# Patient Record
Sex: FEMALE | Race: WHITE | NOT HISPANIC OR LATINO | Employment: OTHER | URBAN - METROPOLITAN AREA
[De-identification: names, ages, dates, MRNs, and addresses within clinical notes are randomized per-mention and may not be internally consistent; named-entity substitution may affect disease eponyms.]

---

## 2017-01-26 DIAGNOSIS — M25.562 LEFT KNEE PAIN: Primary | ICD-10-CM

## 2017-01-31 ENCOUNTER — CLINICAL SUPPORT (OUTPATIENT)
Dept: REHABILITATION | Facility: HOSPITAL | Age: 77
End: 2017-01-31
Attending: PSYCHIATRY & NEUROLOGY
Payer: COMMERCIAL

## 2017-01-31 DIAGNOSIS — G89.29 CHRONIC PAIN OF LEFT KNEE: Primary | ICD-10-CM

## 2017-01-31 DIAGNOSIS — M25.562 CHRONIC PAIN OF LEFT KNEE: Primary | ICD-10-CM

## 2017-01-31 PROCEDURE — 97110 THERAPEUTIC EXERCISES: CPT | Performed by: PHYSICAL THERAPIST

## 2017-01-31 PROCEDURE — G8978 MOBILITY CURRENT STATUS: HCPCS | Mod: CL | Performed by: PHYSICAL THERAPIST

## 2017-01-31 PROCEDURE — G8979 MOBILITY GOAL STATUS: HCPCS | Mod: CJ | Performed by: PHYSICAL THERAPIST

## 2017-01-31 PROCEDURE — 97162 PT EVAL MOD COMPLEX 30 MIN: CPT | Performed by: PHYSICAL THERAPIST

## 2017-01-31 PROCEDURE — 97140 MANUAL THERAPY 1/> REGIONS: CPT | Performed by: PHYSICAL THERAPIST
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
## 2017-02-03 ENCOUNTER — CLINICAL SUPPORT (OUTPATIENT)
Dept: REHABILITATION | Facility: HOSPITAL | Age: 77
End: 2017-02-03
Attending: PSYCHIATRY & NEUROLOGY
Payer: COMMERCIAL

## 2017-02-03 DIAGNOSIS — G89.29 CHRONIC PAIN OF LEFT KNEE: Primary | ICD-10-CM

## 2017-02-03 DIAGNOSIS — M25.562 CHRONIC PAIN OF LEFT KNEE: Primary | ICD-10-CM

## 2017-02-03 PROCEDURE — 97014 ELECTRIC STIMULATION THERAPY: CPT | Performed by: PHYSICAL THERAPIST

## 2017-02-03 PROCEDURE — 97110 THERAPEUTIC EXERCISES: CPT | Performed by: PHYSICAL THERAPIST

## 2017-02-03 PROCEDURE — 97140 MANUAL THERAPY 1/> REGIONS: CPT | Performed by: PHYSICAL THERAPIST
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
## 2017-02-08 ENCOUNTER — CLINICAL SUPPORT (OUTPATIENT)
Dept: REHABILITATION | Facility: HOSPITAL | Age: 77
End: 2017-02-08
Attending: PSYCHIATRY & NEUROLOGY
Payer: COMMERCIAL

## 2017-02-08 DIAGNOSIS — M25.562 CHRONIC PAIN OF LEFT KNEE: Primary | ICD-10-CM

## 2017-02-08 DIAGNOSIS — G89.29 CHRONIC PAIN OF LEFT KNEE: Primary | ICD-10-CM

## 2017-02-08 PROCEDURE — 97140 MANUAL THERAPY 1/> REGIONS: CPT | Performed by: PHYSICAL THERAPIST

## 2017-02-08 PROCEDURE — 97014 ELECTRIC STIMULATION THERAPY: CPT | Performed by: PHYSICAL THERAPIST

## 2017-02-08 PROCEDURE — 97110 THERAPEUTIC EXERCISES: CPT | Performed by: PHYSICAL THERAPIST
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
## 2017-02-10 ENCOUNTER — CLINICAL SUPPORT (OUTPATIENT)
Dept: REHABILITATION | Facility: HOSPITAL | Age: 77
End: 2017-02-10
Attending: PSYCHIATRY & NEUROLOGY
Payer: COMMERCIAL

## 2017-02-10 DIAGNOSIS — M25.562 CHRONIC PAIN OF LEFT KNEE: Primary | ICD-10-CM

## 2017-02-10 DIAGNOSIS — G89.29 CHRONIC PAIN OF LEFT KNEE: Primary | ICD-10-CM

## 2017-02-10 PROCEDURE — 97014 ELECTRIC STIMULATION THERAPY: CPT | Performed by: PHYSICAL THERAPIST

## 2017-02-10 PROCEDURE — 97140 MANUAL THERAPY 1/> REGIONS: CPT | Performed by: PHYSICAL THERAPIST

## 2017-02-10 PROCEDURE — 97110 THERAPEUTIC EXERCISES: CPT | Performed by: PHYSICAL THERAPIST
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
## 2017-02-14 ENCOUNTER — CLINICAL SUPPORT (OUTPATIENT)
Dept: REHABILITATION | Facility: HOSPITAL | Age: 77
End: 2017-02-14
Attending: PSYCHIATRY & NEUROLOGY
Payer: COMMERCIAL

## 2017-02-14 DIAGNOSIS — M25.562 CHRONIC PAIN OF LEFT KNEE: Primary | ICD-10-CM

## 2017-02-14 DIAGNOSIS — G89.29 CHRONIC PAIN OF LEFT KNEE: Primary | ICD-10-CM

## 2017-02-14 PROCEDURE — 97014 ELECTRIC STIMULATION THERAPY: CPT | Performed by: PHYSICAL THERAPIST

## 2017-02-14 PROCEDURE — 97140 MANUAL THERAPY 1/> REGIONS: CPT | Performed by: PHYSICAL THERAPIST

## 2017-02-14 PROCEDURE — 97110 THERAPEUTIC EXERCISES: CPT | Performed by: PHYSICAL THERAPIST
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
## 2017-02-17 ENCOUNTER — CLINICAL SUPPORT (OUTPATIENT)
Dept: REHABILITATION | Facility: HOSPITAL | Age: 77
End: 2017-02-17
Attending: PSYCHIATRY & NEUROLOGY
Payer: COMMERCIAL

## 2017-02-17 DIAGNOSIS — M25.562 CHRONIC PAIN OF LEFT KNEE: Primary | ICD-10-CM

## 2017-02-17 DIAGNOSIS — G89.29 CHRONIC PAIN OF LEFT KNEE: Primary | ICD-10-CM

## 2017-02-17 PROCEDURE — 97140 MANUAL THERAPY 1/> REGIONS: CPT | Performed by: PHYSICAL THERAPIST

## 2017-02-17 PROCEDURE — 97014 ELECTRIC STIMULATION THERAPY: CPT | Performed by: PHYSICAL THERAPIST

## 2017-02-17 PROCEDURE — 97110 THERAPEUTIC EXERCISES: CPT | Performed by: PHYSICAL THERAPIST
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
## 2017-02-22 ENCOUNTER — CLINICAL SUPPORT (OUTPATIENT)
Dept: REHABILITATION | Facility: HOSPITAL | Age: 77
End: 2017-02-22
Attending: PSYCHIATRY & NEUROLOGY
Payer: MEDICARE

## 2017-02-22 DIAGNOSIS — G89.29 CHRONIC PAIN OF LEFT KNEE: Primary | ICD-10-CM

## 2017-02-22 DIAGNOSIS — M25.562 CHRONIC PAIN OF LEFT KNEE: Primary | ICD-10-CM

## 2017-02-22 PROCEDURE — 97140 MANUAL THERAPY 1/> REGIONS: CPT | Performed by: PHYSICAL THERAPIST

## 2017-02-22 PROCEDURE — 97110 THERAPEUTIC EXERCISES: CPT | Performed by: PHYSICAL THERAPIST
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
## 2017-02-24 ENCOUNTER — CLINICAL SUPPORT (OUTPATIENT)
Dept: REHABILITATION | Facility: HOSPITAL | Age: 77
End: 2017-02-24
Attending: PSYCHIATRY & NEUROLOGY
Payer: COMMERCIAL

## 2017-02-24 DIAGNOSIS — M25.562 CHRONIC PAIN OF LEFT KNEE: Primary | ICD-10-CM

## 2017-02-24 DIAGNOSIS — G89.29 CHRONIC PAIN OF LEFT KNEE: Primary | ICD-10-CM

## 2017-02-24 PROCEDURE — 97140 MANUAL THERAPY 1/> REGIONS: CPT | Performed by: PHYSICAL THERAPIST

## 2017-02-24 PROCEDURE — 97110 THERAPEUTIC EXERCISES: CPT | Performed by: PHYSICAL THERAPIST
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
## 2017-02-27 ENCOUNTER — CLINICAL SUPPORT (OUTPATIENT)
Dept: REHABILITATION | Facility: HOSPITAL | Age: 77
End: 2017-02-27
Attending: PSYCHIATRY & NEUROLOGY
Payer: COMMERCIAL

## 2017-02-27 DIAGNOSIS — G89.29 CHRONIC PAIN OF LEFT KNEE: Primary | ICD-10-CM

## 2017-02-27 DIAGNOSIS — M25.562 CHRONIC PAIN OF LEFT KNEE: Primary | ICD-10-CM

## 2017-02-27 DIAGNOSIS — M25.562 ACUTE PAIN OF LEFT KNEE: ICD-10-CM

## 2017-02-27 PROCEDURE — 97140 MANUAL THERAPY 1/> REGIONS: CPT | Performed by: PHYSICAL THERAPIST

## 2017-02-27 PROCEDURE — 97110 THERAPEUTIC EXERCISES: CPT | Performed by: PHYSICAL THERAPIST
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
## 2017-03-01 ENCOUNTER — CLINICAL SUPPORT (OUTPATIENT)
Dept: REHABILITATION | Facility: HOSPITAL | Age: 77
End: 2017-03-01
Attending: PSYCHIATRY & NEUROLOGY
Payer: MEDICARE

## 2017-03-01 DIAGNOSIS — G89.29 CHRONIC PAIN OF LEFT KNEE: Primary | ICD-10-CM

## 2017-03-01 DIAGNOSIS — M25.562 CHRONIC PAIN OF LEFT KNEE: Primary | ICD-10-CM

## 2017-03-01 PROCEDURE — G8978 MOBILITY CURRENT STATUS: HCPCS | Mod: CK | Performed by: PHYSICAL THERAPIST

## 2017-03-01 PROCEDURE — 97140 MANUAL THERAPY 1/> REGIONS: CPT | Performed by: PHYSICAL THERAPIST

## 2017-03-01 PROCEDURE — G8979 MOBILITY GOAL STATUS: HCPCS | Mod: CJ | Performed by: PHYSICAL THERAPIST

## 2017-03-01 PROCEDURE — 97110 THERAPEUTIC EXERCISES: CPT | Performed by: PHYSICAL THERAPIST
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
## 2017-03-06 ENCOUNTER — CLINICAL SUPPORT (OUTPATIENT)
Dept: REHABILITATION | Facility: HOSPITAL | Age: 77
End: 2017-03-06
Attending: PSYCHIATRY & NEUROLOGY
Payer: MEDICARE

## 2017-03-06 DIAGNOSIS — M25.562 CHRONIC PAIN OF LEFT KNEE: Primary | ICD-10-CM

## 2017-03-06 DIAGNOSIS — G89.29 CHRONIC PAIN OF LEFT KNEE: Primary | ICD-10-CM

## 2017-03-06 PROCEDURE — 97110 THERAPEUTIC EXERCISES: CPT | Performed by: PHYSICAL THERAPIST
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
## 2017-03-14 ENCOUNTER — CLINICAL SUPPORT (OUTPATIENT)
Dept: REHABILITATION | Facility: HOSPITAL | Age: 77
End: 2017-03-14
Attending: PSYCHIATRY & NEUROLOGY
Payer: MEDICARE

## 2017-03-14 DIAGNOSIS — G89.29 CHRONIC PAIN OF LEFT KNEE: Primary | ICD-10-CM

## 2017-03-14 DIAGNOSIS — M25.562 CHRONIC PAIN OF LEFT KNEE: Primary | ICD-10-CM

## 2017-03-14 PROCEDURE — 97110 THERAPEUTIC EXERCISES: CPT | Performed by: PHYSICAL THERAPIST

## 2017-03-14 PROCEDURE — 97140 MANUAL THERAPY 1/> REGIONS: CPT | Performed by: PHYSICAL THERAPIST
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
## 2017-03-17 ENCOUNTER — CLINICAL SUPPORT (OUTPATIENT)
Dept: REHABILITATION | Facility: HOSPITAL | Age: 77
End: 2017-03-17
Attending: PSYCHIATRY & NEUROLOGY
Payer: MEDICARE

## 2017-03-17 DIAGNOSIS — G89.29 CHRONIC PAIN OF LEFT KNEE: Primary | ICD-10-CM

## 2017-03-17 DIAGNOSIS — M25.562 CHRONIC PAIN OF LEFT KNEE: Primary | ICD-10-CM

## 2017-03-17 PROCEDURE — 97110 THERAPEUTIC EXERCISES: CPT | Performed by: PHYSICAL THERAPIST
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
## 2017-03-20 ENCOUNTER — CLINICAL SUPPORT (OUTPATIENT)
Dept: REHABILITATION | Facility: HOSPITAL | Age: 77
End: 2017-03-20
Attending: PSYCHIATRY & NEUROLOGY
Payer: MEDICARE

## 2017-03-20 DIAGNOSIS — G89.29 CHRONIC PAIN OF LEFT KNEE: Primary | ICD-10-CM

## 2017-03-20 DIAGNOSIS — M25.562 CHRONIC PAIN OF LEFT KNEE: Primary | ICD-10-CM

## 2017-03-20 PROCEDURE — G8979 MOBILITY GOAL STATUS: HCPCS | Mod: CJ | Performed by: PHYSICAL THERAPIST

## 2017-03-20 PROCEDURE — 97110 THERAPEUTIC EXERCISES: CPT | Performed by: PHYSICAL THERAPIST

## 2017-03-20 PROCEDURE — 97140 MANUAL THERAPY 1/> REGIONS: CPT | Performed by: PHYSICAL THERAPIST

## 2017-03-20 PROCEDURE — G8978 MOBILITY CURRENT STATUS: HCPCS | Mod: CK | Performed by: PHYSICAL THERAPIST

## 2017-03-20 PROCEDURE — G8980 MOBILITY D/C STATUS: HCPCS | Mod: CK | Performed by: PHYSICAL THERAPIST
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
## 2017-03-23 ENCOUNTER — CLINICAL SUPPORT (OUTPATIENT)
Dept: REHABILITATION | Facility: HOSPITAL | Age: 77
End: 2017-03-23
Attending: PSYCHIATRY & NEUROLOGY
Payer: MEDICARE

## 2017-03-23 DIAGNOSIS — M25.562 CHRONIC PAIN OF LEFT KNEE: Primary | ICD-10-CM

## 2017-03-23 DIAGNOSIS — G89.29 CHRONIC PAIN OF LEFT KNEE: Primary | ICD-10-CM

## 2017-03-23 PROCEDURE — 97110 THERAPEUTIC EXERCISES: CPT | Performed by: PHYSICAL THERAPIST

## 2017-03-23 PROCEDURE — 97014 ELECTRIC STIMULATION THERAPY: CPT | Performed by: PHYSICAL THERAPIST

## 2017-03-23 PROCEDURE — 97140 MANUAL THERAPY 1/> REGIONS: CPT | Performed by: PHYSICAL THERAPIST
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
## 2017-03-29 ENCOUNTER — CLINICAL SUPPORT (OUTPATIENT)
Dept: REHABILITATION | Facility: HOSPITAL | Age: 77
End: 2017-03-29
Attending: PSYCHIATRY & NEUROLOGY
Payer: MEDICARE

## 2017-03-29 DIAGNOSIS — M25.562 CHRONIC PAIN OF LEFT KNEE: Primary | ICD-10-CM

## 2017-03-29 DIAGNOSIS — M25.562 ACUTE PAIN OF LEFT KNEE: ICD-10-CM

## 2017-03-29 DIAGNOSIS — G89.29 CHRONIC PAIN OF LEFT KNEE: Primary | ICD-10-CM

## 2017-03-29 PROCEDURE — G8980 MOBILITY D/C STATUS: HCPCS | Mod: CJ | Performed by: PHYSICAL THERAPIST

## 2017-03-29 PROCEDURE — 97110 THERAPEUTIC EXERCISES: CPT | Performed by: PHYSICAL THERAPIST

## 2017-03-29 PROCEDURE — 97014 ELECTRIC STIMULATION THERAPY: CPT | Performed by: PHYSICAL THERAPIST

## 2017-03-29 PROCEDURE — G8978 MOBILITY CURRENT STATUS: HCPCS | Mod: CJ | Performed by: PHYSICAL THERAPIST

## 2017-03-29 PROCEDURE — G8979 MOBILITY GOAL STATUS: HCPCS | Mod: CJ | Performed by: PHYSICAL THERAPIST
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
## 2019-01-07 ENCOUNTER — OFFICE VISIT (OUTPATIENT)
Dept: URGENT CARE | Facility: CLINIC | Age: 79
End: 2019-01-07
Payer: MEDICARE

## 2019-01-07 VITALS
HEART RATE: 77 BPM | WEIGHT: 182.75 LBS | DIASTOLIC BLOOD PRESSURE: 60 MMHG | BODY MASS INDEX: 30.41 KG/M2 | OXYGEN SATURATION: 99 % | SYSTOLIC BLOOD PRESSURE: 104 MMHG | TEMPERATURE: 98 F

## 2019-01-07 DIAGNOSIS — R30.0 DYSURIA: Primary | ICD-10-CM

## 2019-01-07 DIAGNOSIS — N30.00 ACUTE CYSTITIS WITHOUT HEMATURIA: ICD-10-CM

## 2019-01-07 LAB
BILIRUB SERPL-MCNC: NEGATIVE MG/DL
BLOOD URINE, POC: 250
COLOR, POC UA: ABNORMAL
GLUCOSE UR QL STRIP: ABNORMAL
KETONES UR QL STRIP: NEGATIVE
LEUKOCYTE ESTERASE URINE, POC: ABNORMAL
NITRITE, POC UA: NEGATIVE
PH, POC UA: 5
PROTEIN, POC: ABNORMAL
SPECIFIC GRAVITY, POC UA: 1
UROBILINOGEN, POC UA: NORMAL

## 2019-01-07 PROCEDURE — 87086 URINE CULTURE/COLONY COUNT: CPT

## 2019-01-07 PROCEDURE — 99214 OFFICE O/P EST MOD 30 MIN: CPT | Mod: S$PBB,,, | Performed by: PHYSICIAN ASSISTANT

## 2019-01-07 PROCEDURE — 99999 PR PBB SHADOW E&M-EST. PATIENT-LVL IV: CPT | Mod: PBBFAC,,, | Performed by: PHYSICIAN ASSISTANT

## 2019-01-07 PROCEDURE — 99214 PR OFFICE/OUTPT VISIT, EST, LEVL IV, 30-39 MIN: ICD-10-PCS | Mod: S$PBB,,, | Performed by: PHYSICIAN ASSISTANT

## 2019-01-07 PROCEDURE — 87077 CULTURE AEROBIC IDENTIFY: CPT | Mod: 59

## 2019-01-07 PROCEDURE — 87088 URINE BACTERIA CULTURE: CPT

## 2019-01-07 PROCEDURE — 81002 URINALYSIS NONAUTO W/O SCOPE: CPT | Mod: PBBFAC,PO | Performed by: PHYSICIAN ASSISTANT

## 2019-01-07 PROCEDURE — 87186 SC STD MICRODIL/AGAR DIL: CPT

## 2019-01-07 PROCEDURE — 99999 PR PBB SHADOW E&M-EST. PATIENT-LVL IV: ICD-10-PCS | Mod: PBBFAC,,, | Performed by: PHYSICIAN ASSISTANT

## 2019-01-07 PROCEDURE — 99214 OFFICE O/P EST MOD 30 MIN: CPT | Mod: PBBFAC,PO | Performed by: PHYSICIAN ASSISTANT

## 2019-01-07 RX ORDER — MINERAL OIL
ENEMA (ML) RECTAL
COMMUNITY

## 2019-01-07 RX ORDER — SULFAMETHOXAZOLE AND TRIMETHOPRIM 800; 160 MG/1; MG/1
1 TABLET ORAL 2 TIMES DAILY
Qty: 14 TABLET | Refills: 0 | Status: SHIPPED | OUTPATIENT
Start: 2019-01-07 | End: 2019-01-14

## 2019-01-07 RX ORDER — POTASSIUM CHLORIDE 750 MG/1
10 TABLET, EXTENDED RELEASE ORAL ONCE
COMMUNITY

## 2019-01-07 RX ORDER — LOSARTAN POTASSIUM 50 MG/1
TABLET ORAL
COMMUNITY
Start: 2018-04-20 | End: 2021-11-11

## 2019-01-07 NOTE — PATIENT INSTRUCTIONS

## 2019-01-07 NOTE — PROGRESS NOTES
"Subjective:       Patient ID: Dawn Gamboa is a 78 y.o. female.    Chief Complaint: Urinary Frequency    Urinary Tract Infection    This is a new problem. The current episode started in the past 7 days (for the last several days noticing odor and cloudiness to urine). The problem occurs every urination. The problem has been unchanged. Quality: denies burning although a few days ago she noticed some "irritation" she thought from a cream she used. The patient is experiencing no pain. There has been no fever. Pertinent negatives include no chills, discharge, flank pain, frequency, hematuria, nausea, urgency, vomiting or rash. She has tried sitz baths for the symptoms. The treatment provided mild relief. has been several years since last UTI but it was a severe one with very resistant bacteria requiring home PICC line and IV abx     Review of Systems   Constitutional: Negative for chills, fatigue and fever.   HENT: Negative for congestion, rhinorrhea, sneezing and sore throat.    Respiratory: Negative for cough and shortness of breath.    Gastrointestinal: Negative for abdominal pain, nausea and vomiting.   Genitourinary: Negative for difficulty urinating, dysuria, flank pain, frequency, hematuria and urgency.   Musculoskeletal: Negative for myalgias.        Of note patient mentions having a fall several weeks ago, mechanical tripped while taking out garbage can. Refused to be medically evaluated but dtg is a RN and has been watching her, she's been ambulating with her walker but initially very limited movement, has been staying home. Reported bruising over right thigh. Patient refuses eval and does not wish to have x rays done   Skin: Negative for rash.   Neurological: Negative for headaches.       Objective:      /60 (BP Location: Right arm, Patient Position: Sitting, BP Method: Large (Automatic))   Pulse 77   Temp 97.8 °F (36.6 °C) (Oral)   Wt 82.9 kg (182 lb 12.2 oz)   SpO2 99%   BMI 30.41 kg/m² "   Physical Exam   Constitutional: She is oriented to person, place, and time. She appears well-developed and well-nourished. No distress.   HENT:   Head: Normocephalic.   Right Ear: External ear normal.   Left Ear: External ear normal.   Nose: Nose normal.   Eyes: Conjunctivae and EOM are normal. Right eye exhibits no discharge. Left eye exhibits no discharge.   Neck: Normal range of motion. Neck supple.   Musculoskeletal:        Neurological: She is alert and oriented to person, place, and time. She has normal reflexes. She displays normal reflexes. Coordination normal.   Skin: Skin is warm and dry. She is not diaphoretic.   Vitals reviewed.      Assessment:       1. Dysuria    2. Acute cystitis without hematuria        Plan:       Dysuria  -     POCT urine dipstick without microscope    Acute cystitis without hematuria  -     sulfamethoxazole-trimethoprim 800-160mg (BACTRIM DS) 800-160 mg Tab; Take 1 tablet by mouth 2 (two) times daily. for 7 days  Dispense: 14 tablet; Refill: 0  -     Urine culture    +UA. Start bactrim due to allergies and age, follow up urine culture.  Discussed f/u with PCP regarding fall as she expressed interest in Physical Therapy which would likely be a good idea. She declines all offers for evaluation of her right hip and pelvis today, states she's doing fine.    Heather Trant PA-C [facility]

## 2019-01-10 LAB — BACTERIA UR CULT: NORMAL

## 2019-03-22 ENCOUNTER — OFFICE VISIT (OUTPATIENT)
Dept: URGENT CARE | Facility: CLINIC | Age: 79
End: 2019-03-22
Payer: MEDICARE

## 2019-03-22 VITALS
BODY MASS INDEX: 31.66 KG/M2 | TEMPERATURE: 99 F | SYSTOLIC BLOOD PRESSURE: 130 MMHG | DIASTOLIC BLOOD PRESSURE: 80 MMHG | WEIGHT: 190.25 LBS | HEART RATE: 102 BPM | OXYGEN SATURATION: 99 %

## 2019-03-22 DIAGNOSIS — N30.90 CYSTITIS: Primary | ICD-10-CM

## 2019-03-22 LAB
BILIRUB SERPL-MCNC: NEGATIVE MG/DL
BLOOD URINE, POC: ABNORMAL
COLOR, POC UA: YELLOW
GLUCOSE UR QL STRIP: NORMAL
KETONES UR QL STRIP: NEGATIVE
LEUKOCYTE ESTERASE URINE, POC: 2
NITRITE, POC UA: NEGATIVE
PH, POC UA: 7
PROTEIN, POC: NEGATIVE
SPECIFIC GRAVITY, POC UA: 1
UROBILINOGEN, POC UA: NORMAL

## 2019-03-22 PROCEDURE — 99214 PR OFFICE/OUTPT VISIT, EST, LEVL IV, 30-39 MIN: ICD-10-PCS | Mod: S$PBB,,, | Performed by: FAMILY MEDICINE

## 2019-03-22 PROCEDURE — 99213 OFFICE O/P EST LOW 20 MIN: CPT | Mod: PBBFAC,PO | Performed by: FAMILY MEDICINE

## 2019-03-22 PROCEDURE — 87086 URINE CULTURE/COLONY COUNT: CPT

## 2019-03-22 PROCEDURE — 87088 URINE BACTERIA CULTURE: CPT

## 2019-03-22 PROCEDURE — 87186 SC STD MICRODIL/AGAR DIL: CPT

## 2019-03-22 PROCEDURE — 87077 CULTURE AEROBIC IDENTIFY: CPT

## 2019-03-22 PROCEDURE — 81002 URINALYSIS NONAUTO W/O SCOPE: CPT | Mod: PBBFAC,PO | Performed by: FAMILY MEDICINE

## 2019-03-22 PROCEDURE — 99214 OFFICE O/P EST MOD 30 MIN: CPT | Mod: S$PBB,,, | Performed by: FAMILY MEDICINE

## 2019-03-22 PROCEDURE — 99999 PR PBB SHADOW E&M-EST. PATIENT-LVL III: ICD-10-PCS | Mod: PBBFAC,,, | Performed by: FAMILY MEDICINE

## 2019-03-22 PROCEDURE — 99999 PR PBB SHADOW E&M-EST. PATIENT-LVL III: CPT | Mod: PBBFAC,,, | Performed by: FAMILY MEDICINE

## 2019-03-22 RX ORDER — SULFAMETHOXAZOLE AND TRIMETHOPRIM 800; 160 MG/1; MG/1
1 TABLET ORAL 2 TIMES DAILY
Qty: 6 TABLET | Refills: 0 | Status: SHIPPED | OUTPATIENT
Start: 2019-03-22 | End: 2019-03-26 | Stop reason: SDUPTHER

## 2019-03-22 NOTE — PROGRESS NOTES
Subjective:       Patient ID: Dawn Gamboa is a 79 y.o. female.    Chief Complaint: Urinary Frequency    Urinary Tract Infection    This is a new problem. The current episode started yesterday. The problem occurs every urination. The problem has been gradually worsening. The quality of the pain is described as burning. The pain is moderate. There has been no fever. There is a history of pyelonephritis. Associated symptoms include hematuria. Pertinent negatives include no discharge, flank pain, frequency, vomiting or bubble bath use. She has tried nothing for the symptoms. The treatment provided no relief.     Review of Systems   Respiratory: Negative for shortness of breath.    Gastrointestinal: Negative for vomiting.   Genitourinary: Positive for hematuria. Negative for flank pain and frequency.       Objective:      Physical Exam   Constitutional: She appears well-developed and well-nourished. No distress.   HENT:   Head: Normocephalic and atraumatic.   Pulmonary/Chest: Effort normal and breath sounds normal. No respiratory distress. She has no wheezes.   Abdominal: Soft. She exhibits no distension. There is no tenderness. There is no guarding.   Musculoskeletal:   No CVA tenderness   Skin: Skin is warm and dry. No rash noted. She is not diaphoretic. No erythema.   Nursing note and vitals reviewed.      Assessment:       1. Cystitis        Plan:     Problem List Items Addressed This Visit        Renal/    Cystitis - Primary    Current Assessment & Plan     Push fluids.  Use cranberry juice.         Relevant Medications    sulfamethoxazole-trimethoprim 800-160mg (BACTRIM DS) 800-160 mg Tab    Other Relevant Orders    POCT urine dipstick without microscope    Urine culture

## 2019-03-25 LAB — BACTERIA UR CULT: NORMAL

## 2019-03-25 NOTE — PROGRESS NOTES
Please call pt with abnormal results. Pts urine has finally come back definitive for Klebsiella, which is resistant to Bactrim.  Will change to Rocephin given her PCN allergy. Where would would she like to have this injection?  [facility]?  Let me know

## 2019-03-25 NOTE — PROGRESS NOTES
Please call pt with abnormal results. Pt does not need appt at this time, unless they have questions or wish to further discuss.  Pt has gram neg whitney in her urine, likely E. Coli, will await C&S, cont abx.

## 2019-03-26 RX ORDER — NITROFURANTOIN 25; 75 MG/1; MG/1
100 CAPSULE ORAL 2 TIMES DAILY
Qty: 14 CAPSULE | Refills: 0 | Status: SHIPPED | OUTPATIENT
Start: 2019-03-26 | End: 2019-04-02

## 2019-04-29 ENCOUNTER — OFFICE VISIT (OUTPATIENT)
Dept: URGENT CARE | Facility: CLINIC | Age: 79
End: 2019-04-29
Payer: MEDICARE

## 2019-04-29 VITALS
HEIGHT: 64 IN | TEMPERATURE: 99 F | BODY MASS INDEX: 32.26 KG/M2 | WEIGHT: 188.94 LBS | OXYGEN SATURATION: 97 % | DIASTOLIC BLOOD PRESSURE: 62 MMHG | RESPIRATION RATE: 14 BRPM | HEART RATE: 83 BPM | SYSTOLIC BLOOD PRESSURE: 138 MMHG

## 2019-04-29 DIAGNOSIS — R39.9 UTI SYMPTOMS: Primary | ICD-10-CM

## 2019-04-29 DIAGNOSIS — R30.0 DYSURIA: ICD-10-CM

## 2019-04-29 LAB
BILIRUB SERPL-MCNC: NEGATIVE MG/DL
BLOOD URINE, POC: ABNORMAL
COLOR, POC UA: ABNORMAL
GLUCOSE UR QL STRIP: NORMAL
KETONES UR QL STRIP: NEGATIVE
LEUKOCYTE ESTERASE URINE, POC: ABNORMAL
NITRITE, POC UA: NEGATIVE
PH, POC UA: 7
PROTEIN, POC: ABNORMAL
SPECIFIC GRAVITY, POC UA: 1
UROBILINOGEN, POC UA: NORMAL

## 2019-04-29 PROCEDURE — 99999 PR PBB SHADOW E&M-EST. PATIENT-LVL IV: ICD-10-PCS | Mod: PBBFAC,,, | Performed by: NURSE PRACTITIONER

## 2019-04-29 PROCEDURE — 81001 URINALYSIS AUTO W/SCOPE: CPT | Mod: PBBFAC,PO | Performed by: NURSE PRACTITIONER

## 2019-04-29 PROCEDURE — 99214 PR OFFICE/OUTPT VISIT, EST, LEVL IV, 30-39 MIN: ICD-10-PCS | Mod: S$PBB,,, | Performed by: NURSE PRACTITIONER

## 2019-04-29 PROCEDURE — 87086 URINE CULTURE/COLONY COUNT: CPT

## 2019-04-29 PROCEDURE — 99214 OFFICE O/P EST MOD 30 MIN: CPT | Mod: S$PBB,,, | Performed by: NURSE PRACTITIONER

## 2019-04-29 PROCEDURE — 99999 PR PBB SHADOW E&M-EST. PATIENT-LVL IV: CPT | Mod: PBBFAC,,, | Performed by: NURSE PRACTITIONER

## 2019-04-29 PROCEDURE — 87077 CULTURE AEROBIC IDENTIFY: CPT

## 2019-04-29 PROCEDURE — 87186 SC STD MICRODIL/AGAR DIL: CPT

## 2019-04-29 PROCEDURE — 99214 OFFICE O/P EST MOD 30 MIN: CPT | Mod: PBBFAC,PO | Performed by: NURSE PRACTITIONER

## 2019-04-29 PROCEDURE — 87088 URINE BACTERIA CULTURE: CPT

## 2019-04-29 RX ORDER — NITROFURANTOIN (MACROCRYSTALS) 100 MG/1
100 CAPSULE ORAL EVERY 12 HOURS
Qty: 14 CAPSULE | Refills: 0 | Status: SHIPPED | OUTPATIENT
Start: 2019-04-29 | End: 2019-05-06

## 2019-04-29 NOTE — PATIENT INSTRUCTIONS
Understanding Urinary Tract Infections (UTIs)  Most UTIs are caused by bacteria, although they may also be caused by viruses or fungi. Bacteria from the bowel are the most common source of infection. The infection may start because of any of the following:  · Sexual activity. During sex, bacteria can travel from the penis, vagina, or rectum into the urethra.   · Bacteria on the skin outside the rectum may travel into the urethra. This is more common in women since the rectum and urethra are closer to each other than in men. Wiping from front to back after using the toilet and keeping the area clean can help prevent germs from getting to the urethra.  · Blockage of urine flow through the urinary tract. If urine sits too long, germs may start to grow out of control.      Parts of the urinary tract  The infection can occur in any part of the urinary tract.  · The kidneys collect and store urine.  · The ureters carry urine from the kidneys to the bladder.  · The bladder holds urine until you are ready to let it out.  · The urethra carries urine from the bladder out of the body. It is shorter in women, so bacteria can move through it more easily. The urethra is longer in men, so a UTI is less likely to reach the bladder or kidneys in men.  Date Last Reviewed: 1/1/2017  © 9696-1137 [employer]. [address] [place] [address], [place] [address]. All rights reserved. This information is not intended as a substitute for professional medical care. Always follow your healthcare professional's instructions.

## 2019-04-29 NOTE — PROGRESS NOTES
Subjective:      Patient ID: Dwan Gamboa is a 79 y.o. female.    Chief Complaint: Urinary Tract Infection (started yesterday)      Urinary Tract Infection    This is a new problem. The current episode started yesterday. The problem has been gradually worsening. The quality of the pain is described as burning. The pain is at a severity of 1/10. There has been no fever. She is not sexually active. There is a history of pyelonephritis. Pertinent negatives include no chills, nausea, vomiting or rash. Associated symptoms comments: Used OTC refresh. She has tried increased fluids (cranberry juice) for the symptoms. Her past medical history is significant for hypertension and a urological procedure.     Review of Systems   Constitutional: Negative for activity change, appetite change, chills, diaphoresis, fatigue, fever and unexpected weight change.   HENT: Negative for congestion, ear discharge, ear pain, postnasal drip, rhinorrhea, sinus pressure, sinus pain, sneezing and sore throat.    Eyes: Negative.    Respiratory: Negative for cough, chest tightness, shortness of breath and wheezing.    Cardiovascular: Negative for chest pain and palpitations.   Gastrointestinal: Negative for abdominal pain, diarrhea, nausea and vomiting.   Endocrine: Negative.    Genitourinary: Positive for dysuria. Negative for decreased urine volume.   Musculoskeletal: Negative for arthralgias and myalgias.   Skin: Negative for rash.   Allergic/Immunologic: Negative for environmental allergies and food allergies.   Neurological: Negative for dizziness, weakness, light-headedness and headaches.   Hematological: Negative for adenopathy.   Psychiatric/Behavioral: Negative for agitation.        Objective:     Vitals:    04/29/19 1116   BP: 138/62   Pulse: 83   Resp: 14   Temp: 98.5 °F (36.9 °C)     Physical Exam   Constitutional: She is oriented to person, place, and time. She appears well-developed and well-nourished. She is cooperative. No  distress.   HENT:   Head: Normocephalic.   Right Ear: Hearing and external ear normal.   Left Ear: Hearing and external ear normal.   Nose: Nose normal.   Eyes: Pupils are equal, round, and reactive to light. Conjunctivae and EOM are normal. Right eye exhibits no discharge. Left eye exhibits no discharge.   Neck: Neck supple.   Cardiovascular: Normal rate, regular rhythm and normal heart sounds.   Pulmonary/Chest: Effort normal and breath sounds normal. No accessory muscle usage. No tachypnea and no bradypnea. No respiratory distress.   Abdominal: Soft. Bowel sounds are normal. She exhibits no distension. There is no tenderness.   Genitourinary:   Genitourinary Comments: In house Urinalysis abnormal findings: HEIDI 2+, Blood 250   Musculoskeletal: Normal range of motion.   Neurological: She is alert and oriented to person, place, and time.   Skin: Skin is warm, dry and intact. Capillary refill takes less than 2 seconds. No rash noted. She is not diaphoretic. No pallor.   Nursing note and vitals reviewed.      Assessment:     1. UTI symptoms    2. Dysuria        Plan:     Dawn was seen today for urinary tract infection.    Diagnoses and all orders for this visit:    UTI symptoms  -     POCT URINE DIPSTICK WITH MICROSCOPE, AUTOMATED  -     Urine culture    Dysuria  -     POCT URINE DIPSTICK WITH MICROSCOPE, AUTOMATED  -     Urine culture    Other orders  -     nitrofurantoin (MACRODANTIN) 100 MG capsule; Take 1 capsule (100 mg total) by mouth every 12 (twelve) hours. for 7 days    · Increase fluids (avoid caffeine or sugary beverages as these will irritate the bladder).   · Take your antibiotics exactly as prescribed and make sure to complete entire course even if you begin to feel better. This will prevent recurrence of infection and antibiotic resistance.   · We have prescribed an antibiotic that covers most bacteria that cause urinary tract infections, however, if your urine culture shows that you have any  bacterial resistance to the antibiotic you were prescribed or an unusual bacterial strain, we will notify you and make any necessary changes. Urine cultures usually result in about three days.   · Please follow up with your primary care provider if your symptoms do not improve within 2-3 days or sooner for any new or worsening symptoms.  · For any severe pain, bright red blood in urine, difficulty urinating, fever that does not improve with Tylenol or Ibuprofen, inability to urinate, incontinence of urine or bowels, or for any other urgent concerns, please go to the ER for immediate evaluation.       MAURO Arnold, FNP-C

## 2019-05-02 LAB — BACTERIA UR CULT: NORMAL

## 2019-05-03 ENCOUNTER — OFFICE VISIT (OUTPATIENT)
Dept: URGENT CARE | Facility: CLINIC | Age: 79
End: 2019-05-03
Payer: MEDICARE

## 2019-05-03 VITALS
BODY MASS INDEX: 32.26 KG/M2 | DIASTOLIC BLOOD PRESSURE: 70 MMHG | WEIGHT: 188.94 LBS | SYSTOLIC BLOOD PRESSURE: 108 MMHG | HEART RATE: 79 BPM | HEIGHT: 64 IN | TEMPERATURE: 98 F | OXYGEN SATURATION: 98 %

## 2019-05-03 DIAGNOSIS — N76.0 ACUTE VAGINITIS: ICD-10-CM

## 2019-05-03 DIAGNOSIS — N39.0 URINARY TRACT INFECTION WITHOUT HEMATURIA, SITE UNSPECIFIED: Primary | ICD-10-CM

## 2019-05-03 LAB
BILIRUB SERPL-MCNC: NORMAL MG/DL
BLOOD URINE, POC: NORMAL
COLOR, POC UA: NORMAL
GLUCOSE UR QL STRIP: NORMAL
KETONES UR QL STRIP: NORMAL
LEUKOCYTE ESTERASE URINE, POC: NORMAL
NITRITE, POC UA: NORMAL
PH, POC UA: 7
PROTEIN, POC: NORMAL
SPECIFIC GRAVITY, POC UA: 1
UROBILINOGEN, POC UA: NORMAL

## 2019-05-03 PROCEDURE — 81001 URINALYSIS AUTO W/SCOPE: CPT | Mod: PBBFAC,PO | Performed by: NURSE PRACTITIONER

## 2019-05-03 PROCEDURE — 87186 SC STD MICRODIL/AGAR DIL: CPT

## 2019-05-03 PROCEDURE — 87086 URINE CULTURE/COLONY COUNT: CPT

## 2019-05-03 PROCEDURE — 99214 OFFICE O/P EST MOD 30 MIN: CPT | Mod: S$PBB,,, | Performed by: NURSE PRACTITIONER

## 2019-05-03 PROCEDURE — 87088 URINE BACTERIA CULTURE: CPT

## 2019-05-03 PROCEDURE — 99214 PR OFFICE/OUTPT VISIT, EST, LEVL IV, 30-39 MIN: ICD-10-PCS | Mod: S$PBB,,, | Performed by: NURSE PRACTITIONER

## 2019-05-03 PROCEDURE — 99215 OFFICE O/P EST HI 40 MIN: CPT | Mod: PBBFAC,PO | Performed by: NURSE PRACTITIONER

## 2019-05-03 PROCEDURE — 99999 PR PBB SHADOW E&M-EST. PATIENT-LVL V: ICD-10-PCS | Mod: PBBFAC,,, | Performed by: NURSE PRACTITIONER

## 2019-05-03 PROCEDURE — 99999 PR PBB SHADOW E&M-EST. PATIENT-LVL V: CPT | Mod: PBBFAC,,, | Performed by: NURSE PRACTITIONER

## 2019-05-03 PROCEDURE — 87077 CULTURE AEROBIC IDENTIFY: CPT | Mod: 59

## 2019-05-03 RX ORDER — CEPHALEXIN 500 MG/1
500 CAPSULE ORAL EVERY 12 HOURS
Qty: 14 CAPSULE | Refills: 0 | Status: SHIPPED | OUTPATIENT
Start: 2019-05-03 | End: 2019-05-10

## 2019-05-03 RX ORDER — CLINDAMYCIN PHOSPHATE 20 MG/G
1 CREAM VAGINAL NIGHTLY
Qty: 40 G | Refills: 0 | Status: SHIPPED | OUTPATIENT
Start: 2019-05-03 | End: 2019-05-10

## 2019-05-03 NOTE — PATIENT INSTRUCTIONS
· Increase fluids (avoid caffeine or sugary beverages as these will irritate the bladder).   · Take your antibiotics exactly as prescribed and make sure to complete entire course even if you begin to feel better. This will prevent recurrence of infection and antibiotic resistance.   · We have prescribed an antibiotic that covers most bacteria that cause urinary tract infections, however, if your urine culture shows that you have any bacterial resistance to the antibiotic you were prescribed or an unusual bacterial strain, we will notify you and make any necessary changes. Urine cultures usually result in about three days.   · Please follow up with your primary care provider if your symptoms do not improve within 2-3 days or sooner for any new or worsening symptoms.  · For any severe pain, bright red blood in urine, difficulty urinating, fever that does not improve with Tylenol or Ibuprofen, inability to urinate, incontinence of urine or bowels, or for any other urgent concerns, please go to the ER for immediate evaluation.     Urinary Tract Infections in Women    Urinary tract infections (UTIs) are most often caused by bacteria (germs). These bacteria enter the urinary tract. The bacteria may come from outside the body. Or they may travel from the skin outside the rectum or vagina into the urethra. Female anatomy makes it easy for bacteria from the bowel to enter a womans urinary tract, which is the most common source of UTI. This means women develop UTIs more often than men. Pain in or around the urinary tract is a common UTI symptom. But the only way to know for sure if you have a UTI for the healthcare provider to test your urine. The two tests that may be done are the urinalysis and urine culture.  Types of UTIs  · Cystitis: A bladder infection (cystitis) is the most common UTI in women. You may have urgent or frequent urination. You may also have pain, burning when you urinate, and bloody urine.  · Urethritis:  This is an inflamed urethra, which is the tube that carries urine from the bladder to outside the body. You may have lower stomach or back pain. You may also have urgent or frequent urination.  · Pyelonephritis: This is a kidney infection. If not treated, it can be serious and damage your kidneys. In severe cases, you may be hospitalized. You may have a fever and lower back pain.  Medicines to treat a UTI  Most UTIs are treated with antibiotics. These kill the bacteria. The length of time you need to take them depends on the type of infection. It may be as short as 3 days. If you have repeated UTIs, a low-dose antibiotic may be needed for several months. Take antibiotics exactly as directed. Dont stop taking them until all of the medicine is gone. If you stop taking the antibiotic too soon, the infection may not go away, and you may develop a resistance to the antibiotic. This can make it much harder to treat.  Lifestyle changes to treat and prevent UTIs  The lifestyle changes below will help get rid of your UTI. They may also help prevent future UTIs.  · Drink plenty of fluids. This includes water, juice, or other caffeine-free drinks. Fluids help flush bacteria out of your body.  · Empty your bladder. Always empty your bladder when you feel the urge to urinate. And always urinate before going to sleep. Urine that stays in your bladder can lead to infection. Try to urinate before and after sex as well.  · Practice good personal hygiene. Wipe yourself from front to back after using the toilet. This helps keep bacteria from getting into the urethra.  · Use condoms during sex. These help prevent UTIs caused by sexually transmitted bacteria. Also, avoid using spermicides during sex. These can increase the risk of UTIs. Choose other forms of birth control instead. For women who tend to get UTIs after sex, a low-dose of a preventive antibiotic may be used. Be sure to discuss this option with your healthcare  provider.  · Follow up with your healthcare provider as directed. He or she may test to make sure the infection has cleared. If needed, more treatment may be started.  Date Last Reviewed: 2017 [employer]. [address] [place] [address], [place] [address]. All rights reserved. This information is not intended as a substitute for professional medical care. Always follow your healthcare professional's instructions.        Bacterial Vaginosis    You have a vaginal infection called bacterial vaginosis (BV). Both good and bad bacteria are present in a healthy vagina. BV occurs when these bacteria get out of balance. The number of bad bacteria increase. And the number of good bacteria decrease.  BV may or may not cause symptoms. If symptoms do occur, they can include:  · Thin, gray, milky-white, or sometimes green discharge  · Unpleasant odor or fishy smell  · Itching, burning, or pain in or around the vagina  It is not known what causes BV, but certain factors can make the problem more likely. This can include:  · Douching  · Having sex with a new partner  · Having sex with more than one partner  BV will sometimes go away on its own. But treatment is usually recommended. This is because untreated BV can increase the risk of more serious health problems such as:  · Pelvic inflammatory disease (PID)  ·  delivery (giving birth to a baby early if youre pregnant)  · HIV and certain other sexually transmitted diseases (STDs)  · Infection after surgery on the reproductive organs  Home care  General care  · BV is most often treated with medicines called antibiotics. These may be given as pills or as a vaginal cream. If antibiotics are prescribed, be sure to use them exactly as directed. Also, be sure to complete all of the medicine, even if your symptoms go away.  · Avoid douching or having sex during treatment.  · If you have sex with a female partner, ask your healthcare provider if she should  also be treated.  Prevention  · Limit or avoid douching.  · Avoid having sex. If you do have sex, then take steps to lower your risk:  ¨ Use condoms when having sex.  ¨ Limit the number of partners you have sex with.  Follow-up care  Follow up with your healthcare provider, or as advised.  When to seek medical advice  Call your healthcare provider right away if:  · You have a fever of 100.4ºF (38ºC) or higher, or as directed by your provider.  · Your symptoms worsen, or they dont go away within a few days of starting treatment.  · You have new pain in the lower belly or pelvic region.  · You have side effects that bother you or a reaction to the pills or cream youre prescribed.  · You or any partners you have sex with have new symptoms, such as a rash, joint pain, or sores.  Date Last Reviewed: 7/30/2015  © 1452-3364 [employer]. [address] [place] [address], [place] [address]. All rights reserved. This information is not intended as a substitute for professional medical care. Always follow your healthcare professional's instructions.

## 2019-05-09 LAB — BACTERIA UR CULT: NORMAL

## 2019-06-27 ENCOUNTER — TELEPHONE (OUTPATIENT)
Dept: PULMONOLOGY | Facility: CLINIC | Age: 79
End: 2019-06-27

## 2019-06-27 NOTE — TELEPHONE ENCOUNTER
----- Message from Jason Chaparro sent at 6/27/2019 12:13 PM CDT -----  Contact: Pt   Pt is coming in on Sept 3 for an appt with the Dr at 1040/ for an abnormal CT wants to discuss if she needs a pft and chest xray due to having the CT and can be reached at [phone]//thanks/dbw

## 2019-09-03 ENCOUNTER — OFFICE VISIT (OUTPATIENT)
Dept: PULMONOLOGY | Facility: CLINIC | Age: 79
End: 2019-09-03
Payer: MEDICARE

## 2019-09-03 VITALS
SYSTOLIC BLOOD PRESSURE: 138 MMHG | WEIGHT: 186.81 LBS | HEIGHT: 64 IN | RESPIRATION RATE: 18 BRPM | HEART RATE: 71 BPM | DIASTOLIC BLOOD PRESSURE: 72 MMHG | OXYGEN SATURATION: 98 % | BODY MASS INDEX: 31.89 KG/M2

## 2019-09-03 DIAGNOSIS — R91.8 MULTIPLE LUNG NODULES: Primary | Chronic | ICD-10-CM

## 2019-09-03 PROCEDURE — 99204 PR OFFICE/OUTPT VISIT, NEW, LEVL IV, 45-59 MIN: ICD-10-PCS | Mod: S$PBB,,, | Performed by: INTERNAL MEDICINE

## 2019-09-03 PROCEDURE — 99999 PR PBB SHADOW E&M-EST. PATIENT-LVL III: CPT | Mod: PBBFAC,,, | Performed by: INTERNAL MEDICINE

## 2019-09-03 PROCEDURE — 99999 PR PBB SHADOW E&M-EST. PATIENT-LVL III: ICD-10-PCS | Mod: PBBFAC,,, | Performed by: INTERNAL MEDICINE

## 2019-09-03 PROCEDURE — 99204 OFFICE O/P NEW MOD 45 MIN: CPT | Mod: S$PBB,,, | Performed by: INTERNAL MEDICINE

## 2019-09-03 PROCEDURE — 99213 OFFICE O/P EST LOW 20 MIN: CPT | Mod: PBBFAC | Performed by: INTERNAL MEDICINE

## 2019-09-03 RX ORDER — GABAPENTIN 400 MG/1
400 CAPSULE ORAL 3 TIMES DAILY
COMMUNITY

## 2019-09-03 NOTE — LETTER
September 3, 2019      Nora Hernandez MD  [address] [facility] [address]           [facility]  [address] [place] [id number]  Phone: [phone]  Fax: [phone]          Patient: Dawn Gamboa   MR Number: [id number]   YOB: 1940   Date of Visit: 9/3/2019       Dear Dr. Nora Hernandez:    Thank you for referring Dawn Gamboa to me for evaluation. Attached you will find relevant portions of my assessment and plan of care.    If you have questions, please do not hesitate to call me. I look forward to following Dawn Gamboa along with you.    Sincerely,    Junaid Bauman MD    Enclosure  CC:  No Recipients    If you would like to receive this communication electronically, please contact [email or web address] or [phone] to request more information on [employer] access.    For providers and/or their staff who would like to refer a patient to [facility], please contact us through our one-stop-shop provider referral line, [facility] , at [phone].    If you feel you have received this communication in error or would no longer like to receive these types of communications, please e-mail [email or web address]

## 2019-09-03 NOTE — PATIENT INSTRUCTIONS
Diagnosing Chest and Lung Problems: Imaging Tests  Youve been told that you need imaging tests to diagnose a problem in your chest or lung. These images (scans) help the healthcare provider find the problem and figure out if it affects other structures. You will likely need more than one imaging test. If a mass has been found, imaging tests can also help find out if it has spread. Common imaging tests are described below.  CT scan  CT scans allow the healthcare provider to see a more detailed picture of the chest and lungs than a regular chest X-ray. During a CT scan, many images are taken of the lungs and chest. A computer combines the images to create one detailed image. In some cases, special dye (contrast) is given through an IV (intravenous) line. The contrast highlights any suspicious area on the scan.  PET scan    Positron emission tomography (PET) is used to diagnose chest and lung problems. For a PET scan, a safe radioactive liquid (tracer) is injected into the bloodstream. It takes about 45 minutes for the tracer to be in your system. Once the tracer is there, the healthcare provider takes a scan of your body. A PET scan can be helpful for finding cancer. It can also help find out if a cancer has spread. This is called staging. In some cases, you may need more testing before cancer is diagnosed or ruled out.  MRI   Like the CT scan, MRI takes many detailed images of the chest and lungs without the use of X-ray radiation. Contrast dye may be given through an IV line. The healthcare provider then takes a scan. MRI helps your provider find out if a mass is affecting other structures in the chest, such as blood vessels.  Getting ready for the test  Before your imaging test, do the following:  · Follow your healthcare providers instructions about eating and drinking  · Tell your provider about the medicines you take. You may need to stop taking certain medicines before the test.  · Discuss any allergies and  health problems with your healthcare provider. Be sure to tell him or her if you are allergic to iodine or contrast or if you have kidney problems.  · Tell your healthcare provider and the healthcare person doing the scan if you wear a medicated adhesive patch.  · Mention if you have any metal in your body. This includes loose pieces of metal or metal devices such as an aneurysm clip, a pacemaker, a prosthesis, braces on your teeth, or an intraocular lens.  · Tell your healthcare provider if you are pregnant.  During the test  For the test, you lie on your back inside a tube-like machine (scanner). You hear loud clicking sounds as images are taken. To make sure the images taken are clear, you must lie still. Straps may be used to help with this. Imaging tests (especially MRI) are done in a confined space. Talk with your healthcare provider before the day of your test if you are afraid of confined spaces. You may be given medicine (sedation) to help you relax during the test.  Risks and complications  · Swelling, infection, or other problems at the IV site  · Contrast or tracer-related problems, such as allergic reaction or kidney damage  · Damage to metal devices or prostheses from large magnetic MRI scanner   Date Last Reviewed: 11/1/2016 © 2000-2017 [employer]. [address] [place] [address], [place] [address]. All rights reserved. This information is not intended as a substitute for professional medical care. Always follow your healthcare professional's instructions.

## 2019-09-03 NOTE — PROGRESS NOTES
New patient    Subjective:      Patient ID: Dawn Gamboa is a 79 y.o. female.    Patient Active Problem List   Diagnosis    Knee pain, acute    Radiculopathy of lumbar region    DJD (degenerative joint disease) of lumbar spine    Impaired gait    Neuropathy    Cystitis    Multiple lung nodules       Problem list has been reviewed.    she has been referred by Nora Hernandez MD for evaluation and management for   Chief Complaint   Patient presents with    Abnormal Ct Scan       Chief Complaint: Abnormal Ct Scan      HPI:  CT abdomen and pelvis of 06/24/19 => a 3 mm nodule @ left lung base and a 5 mm subpleural nodule in LLL    Patient reports cough and difficulty breathing and denies wheezing. Patient denies recent sick contacts.   Patient does have not new pets. Patient does not have a history of asthma. Patient does not have a history of environmental allergens. Patient has not traveled recently. Patient does not have a history of smoking.     Patient has had a previous chest x-ray. Patient has had a PPD done.  PPD was Negative      Previous Report Reviewed: lab reports, office notes and radiology reports     Past Medical History: The following portions of the patient's history were reviewed and updated as appropriate:   She  has a past surgical history that includes Gallbladder surgery; Anterior cervical discectomy w/ fusion; and Laminectomy and microdiscectomy lumbar spine.  Her family history is not on file.  She  reports that she has never smoked. She does not have any smokeless tobacco history on file. Her alcohol and drug histories are not on file.  She has a current medication list which includes the following prescription(s): amlodipine, aspirin, calcium-vitamin d3, fexofenadine, fish oil-omega-3 fatty acids, fluticasone propionate, folbic, gabapentin, gabapentin, lactobacillus acidophilus & bulgar, levothyroxine, lorazepam, losartan, polyethylene glycol, potassium chloride, pravastatin,  "sodium chloride 2%, and walker.  She is allergic to atenolol; hyoscyamine sulfate; and levofloxacin..    Review of Systems   Constitutional: Negative for chills, fatigue and night sweats.   HENT: Positive for congestion. Negative for nosebleeds, rhinorrhea, sinus pressure and sore throat.    Respiratory: Negative for snoring, cough, wheezing and dyspnea on extertion.    Cardiovascular: Positive for leg swelling. Negative for chest pain and palpitations.   Genitourinary: Positive for difficulty urinating.   Endocrine: Negative for cold intolerance.    Musculoskeletal: Positive for arthralgias and back pain.   Skin: Positive for rash.   Gastrointestinal: Negative for nausea, abdominal pain, abdominal distention and acid reflux.   Neurological: Negative for syncope, light-headedness and headaches.   Psychiatric/Behavioral: The patient is nervous/anxious.           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    Deneis occupational exposure to asbestos, silica and petrochemicals.    Avocational Exposures:  none    Pet Exposures:  Dog: denies allergy to dog dander.       Objective:     Vitals:    09/03/19 1057   BP: 138/72   Pulse: 71   Resp: 18   SpO2: 98%   Weight: 84.8 kg (186 lb 13.4 oz)   Height: 5' 4" (1.626 m)     Body mass index is 32.07 kg/m².    Physical Exam   Constitutional: She is oriented to person, place, and time. She appears well-developed and well-nourished. No distress.   HENT:   Head: Normocephalic and atraumatic.   Neck: Normal range of motion. Neck supple.   Cardiovascular: Normal rate and regular rhythm.   Pulmonary/Chest: Effort normal. No stridor. No respiratory distress. She has no wheezes.   Abdominal: Soft. Normal appearance and bowel sounds are normal. She exhibits no distension. There is no hepatosplenomegaly. There is no tenderness. There is no " rebound, no guarding, no CVA tenderness, no tenderness at McBurney's point and negative Vizcarra's sign.   Musculoskeletal: Normal range of motion.   Neurological: She is alert and oriented to person, place, and time.   Skin: Skin is warm and dry. Capillary refill takes less than 2 seconds. No rash noted. She is not diaphoretic.   Psychiatric: She has a normal mood and affect.   Nursing note and vitals reviewed.      Personal Diagnostic Review  No results found for this or any previous visit.      Assessment /Plan:     Discussed diagnosis, its evaluation, treatment and usual course. All questions answered.    Problem List Items Addressed This Visit        Pulmonary    Multiple lung nodules - Primary (Chronic)    Current Assessment & Plan     Repeat CT chest in 6 months.     Fleischner Society Guidelines:    High risk patient:   Smoking history, history of malignancy or risk factors for malignancy.( non-solid ground glass opacities and partially solid nodules may require longer follow up to exclude indolent adenocarcinoma)      Nodule size Low risk patient High risk patient   4 mm  No follow up Follow up CT in 12 months. If unchanged, no further follow up.   4 - 6 mm Follow up CT in 12 months; if unchanged, no further follow up.  Initial follow up CT in 6 - 12 months, then at 18 - 24 months if no change.      6 - 8 mm  Initial follow up CT at 6 - 12 months and at 18 months if no change.  Initial follow up CT at 3 - 6 months. Then at 9-12 months and 24 months if no change.      > 8 mm Initial follow up CT at 3, 6, 9 and 24 months, dynamic contrast enhanced CT, PET-CT and/or biopsy. Initial follow up CT at 3, 6, 9 and 24 months, dynamic contrast enhanced CT, PET-CT and/or biopsy.              Relevant Orders    CT Chest With Contrast    Basic metabolic panel              TIME SPENT WITH PATIENT: Time spent: 40 minutes in face to face  discussion concerning diagnosis, prognosis, review of lab and test results, benefits of  treatment as well as management of disease, counseling of patient and coordination of care between various health  care providers . Greater than half the time spent was used for coordination of care and counseling of patient.     Follow up in about 6 months (around 3/3/2020) for Multiple lung nodules.

## 2019-09-03 NOTE — ASSESSMENT & PLAN NOTE
Repeat CT chest in 6 months.     Fleischner Society Guidelines:    High risk patient:   Smoking history, history of malignancy or risk factors for malignancy.( non-solid ground glass opacities and partially solid nodules may require longer follow up to exclude indolent adenocarcinoma)      Nodule size Low risk patient High risk patient   4 mm  No follow up Follow up CT in 12 months. If unchanged, no further follow up.   4 - 6 mm Follow up CT in 12 months; if unchanged, no further follow up.  Initial follow up CT in 6 - 12 months, then at 18 - 24 months if no change.      6 - 8 mm  Initial follow up CT at 6 - 12 months and at 18 months if no change.  Initial follow up CT at 3 - 6 months. Then at 9-12 months and 24 months if no change.      > 8 mm Initial follow up CT at 3, 6, 9 and 24 months, dynamic contrast enhanced CT, PET-CT and/or biopsy. Initial follow up CT at 3, 6, 9 and 24 months, dynamic contrast enhanced CT, PET-CT and/or biopsy.

## 2019-09-14 ENCOUNTER — OFFICE VISIT (OUTPATIENT)
Dept: URGENT CARE | Facility: CLINIC | Age: 79
End: 2019-09-14
Payer: MEDICARE

## 2019-09-14 VITALS
DIASTOLIC BLOOD PRESSURE: 70 MMHG | BODY MASS INDEX: 31.62 KG/M2 | SYSTOLIC BLOOD PRESSURE: 145 MMHG | HEIGHT: 64 IN | WEIGHT: 185.19 LBS | TEMPERATURE: 98 F | HEART RATE: 76 BPM

## 2019-09-14 DIAGNOSIS — J30.9 ALLERGIC RHINITIS, UNSPECIFIED SEASONALITY, UNSPECIFIED TRIGGER: Primary | ICD-10-CM

## 2019-09-14 PROCEDURE — 99999 PR PBB SHADOW E&M-EST. PATIENT-LVL III: CPT | Mod: PBBFAC,,, | Performed by: PHYSICIAN ASSISTANT

## 2019-09-14 PROCEDURE — 99213 PR OFFICE/OUTPT VISIT, EST, LEVL III, 20-29 MIN: ICD-10-PCS | Mod: S$PBB,,, | Performed by: PHYSICIAN ASSISTANT

## 2019-09-14 PROCEDURE — 99213 OFFICE O/P EST LOW 20 MIN: CPT | Mod: PBBFAC,PO | Performed by: PHYSICIAN ASSISTANT

## 2019-09-14 PROCEDURE — 99213 OFFICE O/P EST LOW 20 MIN: CPT | Mod: S$PBB,,, | Performed by: PHYSICIAN ASSISTANT

## 2019-09-14 PROCEDURE — 99999 PR PBB SHADOW E&M-EST. PATIENT-LVL III: ICD-10-PCS | Mod: PBBFAC,,, | Performed by: PHYSICIAN ASSISTANT

## 2019-09-14 NOTE — PATIENT INSTRUCTIONS
Advise increase p.o. fluids--at least 64 ounces of water/juice & rest  Meds:  Continue Allegra daily.  Add Flonase daily to regimen.    Normal saline nasal wash to irrigate sinuses and for congestion/runny nose.  Cool mist humidifier/vaporizer.  Practice good handwashing.  Mucinex for cough and chest congestion.  Tylenol or Ibuprofen for fever, headache and body aches.  Warm salt water gargles for throat comfort.  Chloraseptic spray or lozenges for throat comfort.  See PCP or go to ER if symptoms worsen or fail to improve with treatment.

## 2019-09-14 NOTE — PROGRESS NOTES
Dawn Gamboa is a 79 y.o. female who presents today with complaints of nasal congestion, post nasal drip, and sinus pressure for 2 days.  Patient denies fever, night sweats, cough, chills, or ear pain.  She states her  has been ill recently and she thinks she is catching what he has.  She is also around young children often.  She currently takes Allegra for allergy symptoms.    Review of Social history, family history, past medical history, allergies, and past surgical history performed.    Review of Systems:  Review of Systems   Constitutional: Negative for fever.   HENT: Positive for congestion and sore throat. Negative for ear pain.    Respiratory: Negative for cough and shortness of breath.    Cardiovascular: Negative for chest pain.   Gastrointestinal: Negative for abdominal pain and nausea.   Genitourinary: Negative for dysuria and frequency.   Musculoskeletal: Negative for back pain.   Neurological: Negative for headaches.   All other systems reviewed and are negative.      Physical Exam:  Vitals:    09/14/19 0928   BP: (!) 145/70   Pulse: 76   Temp: 98.4 °F (36.9 °C)     Physical Exam   Constitutional: She is oriented to person, place, and time and well-developed, well-nourished, and in no distress.   HENT:   Head: Normocephalic.   Right Ear: Tympanic membrane and external ear normal.   Left Ear: Tympanic membrane and external ear normal.   Nose: Mucosal edema and rhinorrhea present.   Mouth/Throat: Uvula is midline and mucous membranes are normal. No oropharyngeal exudate or posterior oropharyngeal erythema.   Neck: Normal range of motion.   Cardiovascular: Normal rate and normal heart sounds.   Pulmonary/Chest: Effort normal and breath sounds normal. No respiratory distress.   Neurological: She is alert and oriented to person, place, and time.   Skin: Skin is warm.   Psychiatric: Affect normal.       Assessment:  Encounter Diagnosis   Name Primary?    Allergic rhinitis, unspecified seasonality,  unspecified trigger Yes       Plan:  Advise increase p.o. fluids--at least 64 ounces of water/juice & rest  Meds:  Continue Allegra daily.  Add Flonase daily to regimen.    Normal saline nasal wash to irrigate sinuses and for congestion/runny nose.  Cool mist humidifier/vaporizer.  Practice good handwashing.  Mucinex for cough and chest congestion.  Tylenol or Ibuprofen for fever, headache and body aches.  Warm salt water gargles for throat comfort.  Chloraseptic spray or lozenges for throat comfort.  See PCP or go to ER if symptoms worsen or fail to improve with treatment.

## 2019-12-13 DIAGNOSIS — G89.29 CHRONIC IDIOPATHIC ANAL PAIN: ICD-10-CM

## 2019-12-13 DIAGNOSIS — K62.89 CHRONIC IDIOPATHIC ANAL PAIN: ICD-10-CM

## 2019-12-13 DIAGNOSIS — M25.562 LEFT KNEE PAIN: Primary | ICD-10-CM

## 2019-12-26 ENCOUNTER — CLINICAL SUPPORT (OUTPATIENT)
Dept: REHABILITATION | Facility: HOSPITAL | Age: 79
End: 2019-12-26
Payer: MEDICARE

## 2019-12-26 DIAGNOSIS — G89.29 CHRONIC PAIN OF LEFT KNEE: Primary | ICD-10-CM

## 2019-12-26 DIAGNOSIS — M25.562 CHRONIC PAIN OF LEFT KNEE: Primary | ICD-10-CM

## 2019-12-26 PROCEDURE — 97014 ELECTRIC STIMULATION THERAPY: CPT

## 2019-12-26 PROCEDURE — 97161 PT EVAL LOW COMPLEX 20 MIN: CPT

## 2019-12-26 PROCEDURE — 97110 THERAPEUTIC EXERCISES: CPT

## 2019-12-26 NOTE — PROGRESS NOTES
"[facility]  Physical Therapy Initial Evaluation    Name: Dawn Gamboa  Clinic Number: [id number]    Therapy Diagnosis:   Encounter Diagnosis   Name Primary?    Chronic pain of left knee Yes     Physician: Nora Hernandez MD    Physician Orders: PT Eval and Treat    Medical Diagnosis from Referral:   M25.562 (ICD-10-CM) - Left knee pain   K62.89,G89.29 (ICD-10-CM) - Chronic idiopathic anal pain       Evaluation Date: 12/26/2019  Authorization Period Expiration: 12/12/2020  Plan of Care Expiration: 1/25/2020  Visit # / Visits authorized: 1/ 20    Time In: 11:18 am   Time Out: 12:05 pm   Total Billable Time: 30 minutes    Precautions: Standard    Subjective   Date of onset: x one month   History of current condition - Dawn reports: left knee pain x months . It swells and pops sometimes. Pain levels vary a great deal. Feels like she is becoming "knock -kneed" - has bunon on right foot that she feels like has changed her gait.      Pain:  Current 3/10, worst 7/10, best 2/10   Location: left knee on sides ( medial and laterally ) Popping will be on lateral knee.   Description: Aching and Sharp  Aggravating Factors: generally worse with more activity , stairs , squatting , pain on riding mower - when braces with her foot to hold self on and pushing pedals / levers  Easing Factors: rest, pillows under knees     Prior Therapy: on right knee in 2016/2017  Social History:  Lives in one story home  lives with their spouse  Occupation: retired   Prior Level of Function: independent   Current Level of Function: modified independent    Imaging, none:      Medical History:   Past Medical History:   Diagnosis Date    Acid reflux     Arthritis     Hypertension     Thyroid disease     Unspecified disorder of kidney and ureter    * fractured pelvis - last year     Surgical History:   Dawn Gamboa  has a past surgical history that includes Gallbladder surgery; Anterior cervical discectomy " w/ fusion; and Laminectomy and microdiscectomy lumbar spine.    Medications:   Dawn has a current medication list which includes the following prescription(s): amlodipine, aspirin, calcium-vitamin d3, fexofenadine, fish oil-omega-3 fatty acids, fluticasone propionate, folbic, gabapentin, gabapentin, lactobacillus acidophilus & bulgar, levothyroxine, lorazepam, losartan, polyethylene glycol, potassium chloride, pravastatin, sodium chloride 2%, and walker.    Allergies:   Review of patient's allergies indicates:   Allergen Reactions    Atenolol      Drops heart rate very low    Hyoscyamine sulfate     Levofloxacin Other (See Comments)     Joint pain  Joint Pain        Pts goals: pain relief     Objective       CMS Impairment/Limitation/Restriction for FOTO LEFS Survey    Therapist reviewed FOTO scores for Dawn Gamboa on 12/26/2019.   FOTO documents entered into [employer] - see Media section.    Limitation Score: 46%  Category: Mobility    Current : CK = at least 40% but < 60% impaired, limited or restricted  Goal: CJ = at least 20% but < 40% impaired, limited or restricted  Discharge: CJ = at least 20% but < 40% impaired, limited or restricted       Gait: incresaed lft tibial external rotation , genu valgum L>R    Squat: Double leg - genu valgus     Balance: WFL with gait / bilateral stance , mild derease on SLS     Knee AROM:     (L) (R)     Flexion   120 128     Extension  -2 0  Knee PROM  Flexion   128 132     Extension  0 3       Strength:  Hip flexors  3+/5 3+/5  Quadriceps  5/5 5/5      Hamstrings  5/5 5/5     Anterior Tibialis 5/5 5/5     Peroneals  5/5 5/5     External rotators 4/5 4/5     Gluteus Medius 4/5 4/5     Gluteus Cheko 4/5 4/5    Joint Mobility:  Mild hypomobility femoral-tibial joint     Tenderness to palpation: Tender to palpate along joint line of left knee at superficial to moderate depth.    Special Test:  Anterior Drawer neg Posterior Drawer neg     Lachman  neg Post  Lachman  neg     Valgus stress  neg Varus stress  neg     Jayden  neg Thessaly  neg         TREATMENT   Treatment Time In: 11:18 am   Treatment Time Out: 12:05 pm   Total Treatment time separate from Evaluation: 30 minutes    Dawn received therapeutic exercises to develop strength, endurance and ROM for 15 minutes including:  Quad sets  30x   Hip abduction side ly x 30  Bilateral hip abduction x 30 to green band        Dawn received the following manual therapy techniques: Joint mobilizations were applied to the: knee for 0 minutes, including:  Not today    Dawn received the following supervised modalities after being cleared for contradictions: TENS:  Dawn received TENS electrical stimulation for pain to the left knee. Pt received continuous mode at a rate of 150 pps for 15 minutes. Dawn tolerated treatment well without any adverse effects.     Dawn received hot pack for 15 minutes to knee with stim.    Home Exercises and Patient Education Provided    Education provided:   -Education on condition, HEP, and plan of care     Written Home Exercises Provided: yes.  Exercises were reviewed and Dawn was able to demonstrate them prior to the end of the session.  Dawn demonstrated good  understanding of the education provided.     See EMR under Patient Instructions for exercises provided 12/26/2019.    Assessment   Dawn is a 79 y.o. female referred to outpatient Physical Therapy with a medical diagnosis of left knee pain . Pt presents with a notable decrease in lower extremity strength , particularly proximally at the hip. She will benefit from a strengthening program as well as joint mobilization to obtain full ROM / mobility     Pt prognosis is Good.   Pt will benefit from skilled outpatient Physical Therapy to address the deficits stated above and in the chart below, provide pt/family education, and to maximize pt's level of independence.     Plan of care discussed with patient:  Yes  Pt's spiritual, cultural and educational needs considered and patient is agreeable to the plan of care and goals as stated below:     Anticipated Barriers for therapy: none    Medical Necessity is demonstrated by the following  History  Co-morbidities and personal factors that may impact the plan of care Co-morbidities:   level of undertstanding of current condition and low back pain     Personal Factors:   coping style     low   Examination  Body Structures and Functions, activity limitations and participation restrictions that may impact the plan of care Body Regions:   lower extremities    Body Systems:    gross symmetry  ROM  strength  gross coordinated movement  balance  gait  transitions  motor control  motor learning    Participation Restrictions:   Difficulty with ADLs and recreational activity     Activity limitations:   Learning and applying knowledge  no deficits    General Tasks and Commands  no deficits    Communication  no deficits    Mobility  walking  moving around using equipment (WC)  using transportation (bus, train, plane, car)  driving (bike, car, motorcycle)    Self care  washing oneself (bathing, drying, washing hands)  caring for body parts (brushing teeth, shaving, grooming)  toileting  dressing  looking after one's health    Domestic Life  shopping  cooking  doing house work (cleaning house, washing dishes, laundry)    Interactions/Relationships  no deficits    Life Areas  no deficits    Community and Social Life  community life  recreation and leisure         moderate   Clinical Presentation stable and uncomplicated low   Decision Making/ Complexity Score: low     Goals:  Short Term Goals: In 3 weeks:  1.I with HEP  2.Patient to demo increased AROM /PROM from current to full extension   3.Patient to report decreased pain subjectively   4.Patient to ambulate about home with minimal pain     Long Term Goals: In 10 weeks  1. Patient to perform daily activities including squatting to lift  from floor and cooking/ food prep without limitation.  2. Patient to demonstrate increased knee AROM/PROM to WFL / equal to right .  3. Patient to demonstrate increased LE strength to 5-/5.  4. Patient to have decreased pain to <2/10 at all times .  5. Patient to score less than  35% on the LEFS.      Plan   Plan of care Certification: 12/26/2019 to 1/25/2020.    Outpatient Physical Therapy 2 times weekly for 10 weeks to include the following interventions: Electrical Stimulation prn, Gait Training, Manual Therapy, Moist Heat/ Ice, Neuromuscular Re-ed, Patient Education, Self Care, Therapeutic Activites and Therapeutic Exercise.     Junaid Yeh, PT    Thank you for this referral.    These services are reasonable and necessary for the conditions set forth above while under my care.

## 2019-12-31 ENCOUNTER — OFFICE VISIT (OUTPATIENT)
Dept: URGENT CARE | Facility: CLINIC | Age: 79
End: 2019-12-31
Payer: MEDICARE

## 2019-12-31 ENCOUNTER — CLINICAL SUPPORT (OUTPATIENT)
Dept: REHABILITATION | Facility: HOSPITAL | Age: 79
End: 2019-12-31
Payer: MEDICARE

## 2019-12-31 VITALS
HEART RATE: 74 BPM | BODY MASS INDEX: 31.58 KG/M2 | DIASTOLIC BLOOD PRESSURE: 76 MMHG | WEIGHT: 185 LBS | RESPIRATION RATE: 18 BRPM | SYSTOLIC BLOOD PRESSURE: 172 MMHG | OXYGEN SATURATION: 99 % | TEMPERATURE: 97 F | HEIGHT: 64 IN

## 2019-12-31 DIAGNOSIS — G89.29 CHRONIC PAIN OF LEFT KNEE: Primary | ICD-10-CM

## 2019-12-31 DIAGNOSIS — M25.562 CHRONIC PAIN OF LEFT KNEE: Primary | ICD-10-CM

## 2019-12-31 DIAGNOSIS — R31.9 HEMATURIA, UNSPECIFIED TYPE: ICD-10-CM

## 2019-12-31 DIAGNOSIS — R39.9 UTI SYMPTOMS: Primary | ICD-10-CM

## 2019-12-31 LAB
BILIRUB UR QL STRIP: NEGATIVE
GLUCOSE UR QL STRIP: NEGATIVE
KETONES UR QL STRIP: NEGATIVE
LEUKOCYTE ESTERASE UR QL STRIP: NEGATIVE
PH, POC UA: 7
POC BLOOD, URINE: POSITIVE
POC NITRATES, URINE: NEGATIVE
PROT UR QL STRIP: POSITIVE
SP GR UR STRIP: 1.01 (ref 1–1.03)
UROBILINOGEN UR STRIP-ACNC: NORMAL (ref 0.1–1.1)

## 2019-12-31 PROCEDURE — 97110 THERAPEUTIC EXERCISES: CPT

## 2019-12-31 PROCEDURE — 81003 URINALYSIS AUTO W/O SCOPE: CPT | Mod: QW,S$GLB,, | Performed by: NURSE PRACTITIONER

## 2019-12-31 PROCEDURE — 81003 POCT URINALYSIS, DIPSTICK, AUTOMATED, W/O SCOPE: ICD-10-PCS | Mod: QW,S$GLB,, | Performed by: NURSE PRACTITIONER

## 2019-12-31 PROCEDURE — 87186 SC STD MICRODIL/AGAR DIL: CPT

## 2019-12-31 PROCEDURE — 87086 URINE CULTURE/COLONY COUNT: CPT

## 2019-12-31 PROCEDURE — 99213 OFFICE O/P EST LOW 20 MIN: CPT | Mod: S$GLB,,, | Performed by: NURSE PRACTITIONER

## 2019-12-31 PROCEDURE — 99213 PR OFFICE/OUTPT VISIT, EST, LEVL III, 20-29 MIN: ICD-10-PCS | Mod: S$GLB,,, | Performed by: NURSE PRACTITIONER

## 2019-12-31 PROCEDURE — 97014 ELECTRIC STIMULATION THERAPY: CPT

## 2019-12-31 PROCEDURE — 87077 CULTURE AEROBIC IDENTIFY: CPT

## 2019-12-31 PROCEDURE — 87088 URINE BACTERIA CULTURE: CPT

## 2019-12-31 NOTE — PLAN OF CARE
"[facility]  Physical Therapy Initial Evaluation    Name: Dawn Gamboa  Clinic Number: [id number]    Therapy Diagnosis:   Encounter Diagnosis   Name Primary?    Chronic pain of left knee Yes     Physician: Nora Hernandez MD    Physician Orders: PT Eval and Treat    Medical Diagnosis from Referral:   M25.562 (ICD-10-CM) - Left knee pain     Evaluation Date: 12/26/2019  Authorization Period Expiration: 12/12/2020  Plan of Care Expiration: 1/25/2020  Visit # / Visits authorized: 1/ 20    Time In: 11:18 am   Time Out: 12:05 pm   Total Billable Time: 30 minutes    Precautions: Standard    Subjective   Date of onset: x one month   History of current condition - Dawn reports: left knee pain x months . It swells and pops sometimes. Pain levels vary a great deal. Feels like she is becoming "knock -kneed" - has bunon on right foot that she feels like has changed her gait.      Pain:  Current 3/10, worst 7/10, best 2/10   Location: left knee on sides ( medial and laterally ) Popping will be on lateral knee.   Description: Aching and Sharp  Aggravating Factors: generally worse with more activity , stairs , squatting , pain on riding mower - when braces with her foot to hold self on and pushing pedals / levers  Easing Factors: rest, pillows under knees     Prior Therapy: on right knee in 2016/2017  Social History:  Lives in one story home  lives with their spouse  Occupation: retired   Prior Level of Function: independent   Current Level of Function: modified independent    Imaging, none:      Medical History:   Past Medical History:   Diagnosis Date    Acid reflux     Arthritis     Hypertension     Thyroid disease     Unspecified disorder of kidney and ureter    * fractured pelvis - last year     Surgical History:   Dawn Gamboa  has a past surgical history that includes Gallbladder surgery; Anterior cervical discectomy w/ fusion; and Laminectomy and microdiscectomy lumbar " spine.    Medications:   Dawn has a current medication list which includes the following prescription(s): amlodipine, aspirin, calcium-vitamin d3, fexofenadine, fish oil-omega-3 fatty acids, fluticasone propionate, folbic, gabapentin, gabapentin, lactobacillus acidophilus & bulgar, levothyroxine, lorazepam, losartan, polyethylene glycol, potassium chloride, pravastatin, sodium chloride 2%, and walker.    Allergies:   Review of patient's allergies indicates:   Allergen Reactions    Atenolol      Drops heart rate very low    Hyoscyamine sulfate     Levofloxacin Other (See Comments)     Joint pain  Joint Pain        Pts goals: pain relief     Objective       CMS Impairment/Limitation/Restriction for FOTO LEFS Survey    Therapist reviewed FOTO scores for Dawn Gamboa on 12/26/2019.   FOTO documents entered into [employer] - see Media section.    Limitation Score: 46%  Category: Mobility    Current : CK = at least 40% but < 60% impaired, limited or restricted  Goal: CJ = at least 20% but < 40% impaired, limited or restricted  Discharge: CJ = at least 20% but < 40% impaired, limited or restricted       Gait: incresaed lft tibial external rotation , genu valgum L>R    Squat: Double leg - genu valgus     Balance: WFL with gait / bilateral stance , mild derease on SLS     Knee AROM:     (L) (R)     Flexion   120 128     Extension  -2 0  Knee PROM  Flexion   128 132     Extension  0 3       Strength:  Hip flexors  3+/5 3+/5  Quadriceps  5/5 5/5      Hamstrings  5/5 5/5     Anterior Tibialis 5/5 5/5     Peroneals  5/5 5/5     External rotators 4/5 4/5     Gluteus Medius 4/5 4/5     Gluteus Cheko 4/5 4/5    Joint Mobility:  Mild hypomobility femoral-tibial joint     Tenderness to palpation: Tender to palpate along joint line of left knee at superficial to moderate depth.    Special Test:  Anterior Drawer neg Posterior Drawer neg     Lachman  neg Post Lachman  neg     Valgus stress  neg Varus  stress  neg     Jayden  neg Thessaly  neg         TREATMENT   Treatment Time In: 11:18 am   Treatment Time Out: 12:05 pm   Total Treatment time separate from Evaluation: 30 minutes    Dawn received therapeutic exercises to develop strength, endurance and ROM for 15 minutes including:  Quad sets  30x   Hip abduction side ly x 30  Bilateral hip abduction x 30 to green band        Dawn received the following manual therapy techniques: Joint mobilizations were applied to the: knee for 0 minutes, including:  Not today    Dawn received the following supervised modalities after being cleared for contradictions: TENS:  Dawn received TENS electrical stimulation for pain to the left knee. Pt received continuous mode at a rate of 150 pps for 15 minutes. Dawn tolerated treatment well without any adverse effects.     Dawn received hot pack for 15 minutes to knee with stim.    Home Exercises and Patient Education Provided    Education provided:   -Education on condition, HEP, and plan of care     Written Home Exercises Provided: yes.  Exercises were reviewed and Dawn was able to demonstrate them prior to the end of the session.  Dawn demonstrated good  understanding of the education provided.     See EMR under Patient Instructions for exercises provided 12/26/2019.    Assessment   Dawn is a 79 y.o. female referred to outpatient Physical Therapy with a medical diagnosis of left knee pain . Pt presents with a notable decrease in lower extremity strength , particularly proximally at the hip. She will benefit from a strengthening program as well as joint mobilization to obtain full ROM / mobility     Pt prognosis is Good.   Pt will benefit from skilled outpatient Physical Therapy to address the deficits stated above and in the chart below, provide pt/family education, and to maximize pt's level of independence.     Plan of care discussed with patient: Yes  Pt's spiritual, cultural and educational  needs considered and patient is agreeable to the plan of care and goals as stated below:     Anticipated Barriers for therapy: none    Medical Necessity is demonstrated by the following  History  Co-morbidities and personal factors that may impact the plan of care Co-morbidities:   level of undertstanding of current condition and low back pain     Personal Factors:   coping style     low   Examination  Body Structures and Functions, activity limitations and participation restrictions that may impact the plan of care Body Regions:   lower extremities    Body Systems:    gross symmetry  ROM  strength  gross coordinated movement  balance  gait  transitions  motor control  motor learning    Participation Restrictions:   Difficulty with ADLs and recreational activity     Activity limitations:   Learning and applying knowledge  no deficits    General Tasks and Commands  no deficits    Communication  no deficits    Mobility  walking  moving around using equipment (WC)  using transportation (bus, train, plane, car)  driving (bike, car, motorcycle)    Self care  washing oneself (bathing, drying, washing hands)  caring for body parts (brushing teeth, shaving, grooming)  toileting  dressing  looking after one's health    Domestic Life  shopping  cooking  doing house work (cleaning house, washing dishes, laundry)    Interactions/Relationships  no deficits    Life Areas  no deficits    Community and Social Life  community life  recreation and leisure         moderate   Clinical Presentation stable and uncomplicated low   Decision Making/ Complexity Score: low     Goals:  Short Term Goals: In 3 weeks:  1.I with HEP  2.Patient to demo increased AROM /PROM from current to full extension   3.Patient to report decreased pain subjectively   4.Patient to ambulate about home with minimal pain     Long Term Goals: In 10 weeks  1. Patient to perform daily activities including squatting to lift from floor and cooking/ food prep without  limitation.  2. Patient to demonstrate increased knee AROM/PROM to WFL / equal to right .  3. Patient to demonstrate increased LE strength to 5-/5.  4. Patient to have decreased pain to <2/10 at all times .  5. Patient to score less than  35% on the LEFS.      Plan   Plan of care Certification: 12/26/2019 to 1/25/2020.    Outpatient Physical Therapy 2 times weekly for 10 weeks to include the following interventions: Electrical Stimulation prn, Gait Training, Manual Therapy, Moist Heat/ Ice, Neuromuscular Re-ed, Patient Education, Self Care, Therapeutic Activites and Therapeutic Exercise.     Junaid Yeh, PT    Thank you for this referral.    These services are reasonable and necessary for the conditions set forth above while under my care.

## 2019-12-31 NOTE — PROGRESS NOTES
"  Physical Therapy Daily Treatment Note     Name: Dawn Gamboa  Clinic Number: [id number]    Therapy Diagnosis:   Encounter Diagnosis   Name Primary?    Chronic pain of left knee Yes     Physician: Nora Hernandez MD    Visit Date: 12/31/2019    Physician Orders: PT Eval and Treat    Medical Diagnosis from Referral:   M25.562 (ICD-10-CM) - Left knee pain      Evaluation Date: 12/26/2019  Authorization Period Expiration: 12/12/2020  Plan of Care Expiration: 1/25/2020  Visit # / Visits authorized: 2 / 20     Time In: 1:00 pm   Time Out: 2:05 pm   Total Billable Time: 40 minutes     Precautions: Standard         Subjective     Pt reports: she was sore after last session - primarily in the lower lumbar spine . She feels it was primarily secondary to lying flat on her back durning session . She reports no new complaints from her knee.   She was compliant with home exercise program.  Response to previous treatment: fair  Functional change: none to date    Pain: 4/10  Location: left knee      Objective     Treatment Time In: 11:18 am   Treatment Time Out: 12:05 pm   Total Treatment time separate from Evaluation: 30 minutes     Dawn received therapeutic exercises to develop strength, endurance and ROM for 25 minutes including:  Quad sets  30x   Hip abduction standing 4# 3 x 10 bilaterally   Hip extension standing 4# 3 x 10 bilaterally   Step ups to 8" step 30x each   Standing TKEs 3 x 15 each   bilateral hip abd x 30 to green band   SAQ 5# 3 x 12 each         Dawn received the following manual therapy techniques: Joint mobilizations were applied to the: knee for 0 minutes, including:  Not today     Dawn received the following supervised modalities after being cleared for contradictions: TENS:  Dawn received TENS electrical stimulation for pain to the left knee. Pt received continuous mode at a rate of 150 pps for 15 minutes. Dawn tolerated treatment well without any adverse effects.      Dawn " received hot pack for 15 minutes to knee with stim.      Home Exercises Provided and Patient Education Provided     Education provided:   - reviewed HEP     Written Home Exercises Provided: Patient instructed to cont prior HEP.  Exercises were reviewed and Dawn was able to demonstrate them prior to the end of the session.  Dawn demonstrated good  understanding of the education provided.     See EMR under Patient Instructions for exercises provided prior visit.    Assessment     Good tolerance to treatment - modified to have no supine exercise and ensure back pain did not become an issue. Patient does demonstrate some maladaptive beliefes regarding her back pain and will benefit from education in such as she is also receiving care for her knee.   Dawn is progressing well towards her goals.   Pt prognosis is Good.     Pt will continue to benefit from skilled outpatient physical therapy to address the deficits listed in the problem list box on initial evaluation, provide pt/family education and to maximize pt's level of independence in the home and community environment.     Pt's spiritual, cultural and educational needs considered and pt agreeable to plan of care and goals.     Anticipated barriers to physical therapy: none    Goals:  Short Term Goals: In 3 weeks:  1.I with HEP  2.Patient to demo increased AROM /PROM from current to full extension   3.Patient to report decreased pain subjectively   4.Patient to ambulate about home with minimal pain      Long Term Goals: In 10 weeks  1. Patient to perform daily activities including squatting to lift from floor and cooking/ food prep without limitation.  2. Patient to demonstrate increased knee AROM/PROM to WFL / equal to right .  3. Patient to demonstrate increased LE strength to 5-/5.  4. Patient to have decreased pain to <2/10 at all times .  5. Patient to score less than  35% on the LEFS.        Plan   Plan of care Certification: 12/26/2019 to  1/25/2020.     Outpatient Physical Therapy 2 times weekly for 10 weeks to include the following interventions: Electrical Stimulation prn, Gait Training, Manual Therapy, Moist Heat/ Ice, Neuromuscular Re-ed, Patient Education, Self Care, Therapeutic Activites and Therapeutic Exercise.       Junaid Yeh, PT

## 2019-12-31 NOTE — PROGRESS NOTES
"Subjective:       Patient ID: Dawn Gamboa is a 79 y.o. female.    Vitals:  height is 5' 4" (1.626 m) and weight is 83.9 kg (185 lb). Her temperature is 96.8 °F (36 °C). Her blood pressure is 172/76 (abnormal) and her pulse is 74. Her respiration is 18 and oxygen saturation is 99%.     Chief Complaint: Urinary Tract Infection (pt c/o urination problems.  pt c/o dark urine and burning with urination.)    pt c/o urination problems.  pt c/o dark urine and burning with urination.    Urinary Tract Infection    This is a chronic problem. The current episode started more than 1 year ago. The problem occurs intermittently. The problem has been unchanged. The quality of the pain is described as burning and aching. The pain is at a severity of 2/10. The pain is mild. There has been no fever. Associated symptoms include frequency. Pertinent negatives include no chills, hematuria, nausea, urgency, vomiting or rash. Her past medical history is significant for recurrent UTIs.       Constitution: Negative for chills and fever.   Neck: Negative for painful lymph nodes.   Gastrointestinal: Negative for abdominal pain, nausea and vomiting.   Genitourinary: Positive for dysuria, frequency and bladder incontinence. Negative for urgency, urine decreased, hematuria, history of kidney stones, painful menstruation, irregular menstruation, missed menses, heavy menstrual bleeding, ovarian cysts, genital trauma, vaginal pain, vaginal discharge, vaginal bleeding, vaginal odor, painful intercourse, genital sore, painful ejaculation and pelvic pain.   Musculoskeletal: Negative for back pain.   Skin: Negative for rash and lesion.   Hematologic/Lymphatic: Negative for swollen lymph nodes.       Objective:      Physical Exam   Constitutional: She is oriented to person, place, and time. She appears well-developed and well-nourished.   HENT:   Head: Normocephalic and atraumatic.   Right Ear: External ear normal.   Left Ear: External ear normal. "   Nose: Nose normal. No nasal deformity. No epistaxis.   Mouth/Throat: Oropharynx is clear and moist and mucous membranes are normal.   Eyes: Lids are normal.   Neck: Trachea normal, normal range of motion and phonation normal. Neck supple.   Cardiovascular: Normal pulses.   Pulmonary/Chest: Effort normal.   Abdominal: Soft. Normal appearance and bowel sounds are normal. She exhibits no distension. There is no tenderness. There is no CVA tenderness.   Neurological: She is alert and oriented to person, place, and time.   Skin: Skin is warm, dry and intact.   Psychiatric: She has a normal mood and affect. Her speech is normal and behavior is normal. Cognition and memory are normal.   Nursing note and vitals reviewed.        Assessment:       1. UTI symptoms    2. Hematuria, unspecified type        Plan:         UTI symptoms  -     POCT Urinalysis, Dipstick, Automated, W/O Scope  -     Culture, Urine    Hematuria, unspecified type          Results for orders placed or performed in visit on 12/31/19   POCT Urinalysis, Dipstick, Automated, W/O Scope   Result Value Ref Range    POC Blood, Urine Positive (A) Negative    POC Bilirubin, Urine Negative Negative    POC Urobilinogen, Urine Normal 0.1 - 1.1    POC Ketones, Urine Negative Negative    POC Protein, Urine Positive (A) Negative    POC Nitrates, Urine Negative Negative    POC Glucose, Urine Negative Negative    pH, UA 7.0     POC Specific Gravity, Urine 1.015 1.003 - 1.029    POC Leukocytes, Urine Negative Negative     · Increase fluids (avoid caffeine or sugary beverages as these will irritate the bladder).   · Take your antibiotics exactly as prescribed and make sure to complete entire course even if you begin to feel better. This will prevent recurrence of infection and antibiotic resistance.  ·  Urine cultures usually result in about three days. Will call with urine culture results  · Please follow up with your primary care provider if your symptoms do not improve  within 2-3 days or sooner for any new or worsening symptoms.  · For any severe pain, bright red blood in urine, difficulty urinating, fever that does not improve with Tylenol or Ibuprofen, inability to urinate, incontinence of urine or bowels, or for any other urgent concerns, please go to the ER for immediate evaluation.

## 2019-12-31 NOTE — PATIENT INSTRUCTIONS
Understanding Urinary Tract Infections (UTIs)  Most UTIs are caused by bacteria, although they may also be caused by viruses or fungi. Bacteria from the bowel are the most common source of infection. The infection may start because of any of the following:  · Sexual activity. During sex, bacteria can travel from the penis, vagina, or rectum into the urethra.   · Bacteria on the skin outside the rectum may travel into the urethra. This is more common in women since the rectum and urethra are closer to each other than in men. Wiping from front to back after using the toilet and keeping the area clean can help prevent germs from getting to the urethra.  · Blockage of urine flow through the urinary tract. If urine sits too long, germs may start to grow out of control.      Parts of the urinary tract  The infection can occur in any part of the urinary tract.  · The kidneys collect and store urine.  · The ureters carry urine from the kidneys to the bladder.  · The bladder holds urine until you are ready to let it out.  · The urethra carries urine from the bladder out of the body. It is shorter in women, so bacteria can move through it more easily. The urethra is longer in men, so a UTI is less likely to reach the bladder or kidneys in men.  Date Last Reviewed: 1/1/2017  © 9409-7892 [employer]. [address] [place] [address], [place] [address]. All rights reserved. This information is not intended as a substitute for professional medical care. Always follow your healthcare professional's instructions.          Blood in the Urine    Blood in the urine (hematuria) has many possible causes. If it occurs after an injury (such as a car accident or fall), it is most often a sign of bruising to the kidney or bladder. Common causes of blood in the urine include urinary tract infections, kidney stones, inflammation, tumors, or certain other diseases of the kidney or bladder. Menstruation can cause blood to appear in the  urine sample, although it is not coming from the urinary tract.  If only a trace amount of blood is present, it will show up on the urine test, even though the urine may be yellow and not pink or red. This may occur with any of the above conditions, as well as heavy exercise or high fever. In this case, your doctor may want to repeat the urine test on another day. This will show if the blood is still present. If it is, then other tests can be done to find out the cause.  Home care  Follow these home care guidelines:  · If your urine does not appear bloody (pink, brown or red) then you do not need to restrict your activity in any way.  · If you can see blood in your urine, rest and avoid heavy exertion until your next exam. Do not use aspirin, blood thinners, or anti-platelet or anti-inflammatory medicines. These include ibuprofen and naproxen. These thin the blood and may increase bleeding.  Follow-up care  Follow up with your healthcare provider, or as advised. If you were injured and had blood in your urine, you should have a repeat urine test in 1 to 2 days. Contact your doctor for this test.  A radiologist will review any X-rays that were taken. You will be told of any new findings that may affect your care.  When to seek medical advice  Call your healthcare provider right away if any of these occur:  · Bright red blood or blood clots in the urine (if you did not have this before)  · Weakness, dizziness or fainting  · New groin, abdominal, or back pain  · Fever of 100.4ºF (38ºC) or higher, or as directed by your healthcare provider  · Repeated vomiting  · Bleeding from the nose or gums or easy bruising  Date Last Reviewed: 9/1/2016  © 7899-4627 [employer]. [address] [place] [address], [place] [address]. All rights reserved. This information is not intended as a substitute for professional medical care. Always follow your healthcare professional's instructions.

## 2020-01-02 DIAGNOSIS — N30.01 ACUTE CYSTITIS WITH HEMATURIA: Primary | ICD-10-CM

## 2020-01-02 RX ORDER — CEPHALEXIN 500 MG/1
500 CAPSULE ORAL EVERY 12 HOURS
Qty: 28 CAPSULE | Refills: 0 | Status: SHIPPED | OUTPATIENT
Start: 2020-01-02 | End: 2020-01-09

## 2020-01-02 NOTE — PROGRESS NOTES
Called and spoke to Mrs. Gamboa and let her know her preliminary urine culture report shows bacterial growth.  Recommended starting an antibiotic until final culture returns.  Sent Keflex to pharmacy.  Per Mrs. Gamboa, she follows up with Urology on Monday for further evaluation of recurrent UTIs.       Tena Suarez PA-C   Physician Assistant   [facility]

## 2020-01-03 ENCOUNTER — TELEPHONE (OUTPATIENT)
Dept: URGENT CARE | Facility: CLINIC | Age: 80
End: 2020-01-03

## 2020-01-03 LAB — BACTERIA UR CULT: ABNORMAL

## 2020-01-03 NOTE — TELEPHONE ENCOUNTER
Called and spoke to Dawn about her urine culture.  Let her know that the antibiotic sent to the pharmacy yesterday should be effective based on her urine culture results.  She expressed understanding.  Dawn is seeing an outside Urologist next week and requested to  her records to bring to her Urology appointment.     Tena Suarez PA-C   Physician Assistant   [facility]

## 2020-01-06 ENCOUNTER — HOSPITAL ENCOUNTER (EMERGENCY)
Facility: HOSPITAL | Age: 80
Discharge: HOME | End: 2020-01-06
Attending: EMERGENCY MEDICINE
Payer: MEDICARE

## 2020-01-06 VITALS
OXYGEN SATURATION: 99 % | HEIGHT: 64 IN | WEIGHT: 187.75 LBS | HEART RATE: 81 BPM | SYSTOLIC BLOOD PRESSURE: 147 MMHG | TEMPERATURE: 98 F | BODY MASS INDEX: 32.05 KG/M2 | RESPIRATION RATE: 18 BRPM | DIASTOLIC BLOOD PRESSURE: 73 MMHG

## 2020-01-06 DIAGNOSIS — I10 ESSENTIAL HYPERTENSION: ICD-10-CM

## 2020-01-06 DIAGNOSIS — R53.83 FATIGUE: ICD-10-CM

## 2020-01-06 DIAGNOSIS — R53.1 WEAKNESS: Primary | ICD-10-CM

## 2020-01-06 DIAGNOSIS — R31.1 BENIGN ESSENTIAL MICROSCOPIC HEMATURIA: ICD-10-CM

## 2020-01-06 LAB
ALBUMIN SERPL BCP-MCNC: 3.9 G/DL (ref 3.5–5.2)
ALP SERPL-CCNC: 58 U/L (ref 55–135)
ALT SERPL W/O P-5'-P-CCNC: 16 U/L (ref 10–44)
ANION GAP SERPL CALC-SCNC: 12 MMOL/L (ref 8–16)
AST SERPL-CCNC: 30 U/L (ref 10–40)
BASOPHILS # BLD AUTO: 0.03 K/UL (ref 0–0.2)
BASOPHILS NFR BLD: 0.5 % (ref 0–1.9)
BILIRUB SERPL-MCNC: 0.3 MG/DL (ref 0.1–1)
BILIRUB UR QL STRIP: NEGATIVE
BUN SERPL-MCNC: 13 MG/DL (ref 8–23)
CALCIUM SERPL-MCNC: 9.5 MG/DL (ref 8.7–10.5)
CHLORIDE SERPL-SCNC: 100 MMOL/L (ref 95–110)
CLARITY UR: CLEAR
CO2 SERPL-SCNC: 22 MMOL/L (ref 23–29)
COLOR UR: YELLOW
CREAT SERPL-MCNC: 1 MG/DL (ref 0.5–1.4)
DIFFERENTIAL METHOD: ABNORMAL
EOSINOPHIL # BLD AUTO: 0 K/UL (ref 0–0.5)
EOSINOPHIL NFR BLD: 0.6 % (ref 0–8)
ERYTHROCYTE [DISTWIDTH] IN BLOOD BY AUTOMATED COUNT: 12.9 % (ref 11.5–14.5)
EST. GFR  (AFRICAN AMERICAN): >60 ML/MIN/1.73 M^2
EST. GFR  (NON AFRICAN AMERICAN): 54 ML/MIN/1.73 M^2
GLUCOSE SERPL-MCNC: 101 MG/DL (ref 70–110)
GLUCOSE UR QL STRIP: NEGATIVE
HCT VFR BLD AUTO: 37.6 % (ref 37–48.5)
HGB BLD-MCNC: 11.9 G/DL (ref 12–16)
HGB UR QL STRIP: ABNORMAL
IMM GRANULOCYTES # BLD AUTO: 0.03 K/UL (ref 0–0.04)
IMM GRANULOCYTES NFR BLD AUTO: 0.5 % (ref 0–0.5)
KETONES UR QL STRIP: NEGATIVE
LEUKOCYTE ESTERASE UR QL STRIP: NEGATIVE
LYMPHOCYTES # BLD AUTO: 1.1 K/UL (ref 1–4.8)
LYMPHOCYTES NFR BLD: 16.8 % (ref 18–48)
MAGNESIUM SERPL-MCNC: 2.1 MG/DL (ref 1.6–2.6)
MCH RBC QN AUTO: 28.1 PG (ref 27–31)
MCHC RBC AUTO-ENTMCNC: 31.6 G/DL (ref 32–36)
MCV RBC AUTO: 89 FL (ref 82–98)
MONOCYTES # BLD AUTO: 0.6 K/UL (ref 0.3–1)
MONOCYTES NFR BLD: 9.4 % (ref 4–15)
NEUTROPHILS # BLD AUTO: 4.5 K/UL (ref 1.8–7.7)
NEUTROPHILS NFR BLD: 72.2 % (ref 38–73)
NITRITE UR QL STRIP: NEGATIVE
NRBC BLD-RTO: 0 /100 WBC
PH UR STRIP: 7 [PH] (ref 5–8)
PLATELET # BLD AUTO: 370 K/UL (ref 150–350)
PMV BLD AUTO: 8.8 FL (ref 9.2–12.9)
POTASSIUM SERPL-SCNC: 4.3 MMOL/L (ref 3.5–5.1)
PROT SERPL-MCNC: 7.9 G/DL (ref 6–8.4)
PROT UR QL STRIP: NEGATIVE
RBC # BLD AUTO: 4.23 M/UL (ref 4–5.4)
SODIUM SERPL-SCNC: 134 MMOL/L (ref 136–145)
SP GR UR STRIP: 1.01 (ref 1–1.03)
TROPONIN I SERPL DL<=0.01 NG/ML-MCNC: 0.01 NG/ML (ref 0–0.03)
TSH SERPL DL<=0.005 MIU/L-ACNC: 0.8 UIU/ML (ref 0.4–4)
URN SPEC COLLECT METH UR: ABNORMAL
UROBILINOGEN UR STRIP-ACNC: NEGATIVE EU/DL
WBC # BLD AUTO: 6.25 K/UL (ref 3.9–12.7)

## 2020-01-06 PROCEDURE — 80053 COMPREHEN METABOLIC PANEL: CPT

## 2020-01-06 PROCEDURE — 93010 EKG 12-LEAD: ICD-10-PCS | Mod: ,,, | Performed by: INTERNAL MEDICINE

## 2020-01-06 PROCEDURE — 81003 URINALYSIS AUTO W/O SCOPE: CPT

## 2020-01-06 PROCEDURE — 93010 ELECTROCARDIOGRAM REPORT: CPT | Mod: ,,, | Performed by: INTERNAL MEDICINE

## 2020-01-06 PROCEDURE — 84484 ASSAY OF TROPONIN QUANT: CPT

## 2020-01-06 PROCEDURE — 85025 COMPLETE CBC W/AUTO DIFF WBC: CPT

## 2020-01-06 PROCEDURE — 93005 ELECTROCARDIOGRAM TRACING: CPT

## 2020-01-06 PROCEDURE — 84443 ASSAY THYROID STIM HORMONE: CPT

## 2020-01-06 PROCEDURE — 83735 ASSAY OF MAGNESIUM: CPT

## 2020-01-06 PROCEDURE — 99284 EMERGENCY DEPT VISIT MOD MDM: CPT | Mod: 25

## 2020-01-06 NOTE — ED PROVIDER NOTES
"  1/6/2020, 12:13 PM   History obtained from the patient      History of Present Illness: Dawn Gamboa is a 79 y.o. female patient presenting to the er for fatigue. Pt also reports she is on keflex for treatment of uti without improvement in symptoms     12:14 PM: Pt was placed back in [facility]. I explained to pt that due to lack of available rooms pt was seen by myself to begin the workup. Pt understands they will be seen and dispositioned by the next available physician. Pt voices understanding and agrees with plan. Pt also understands the longer than normal wait time. I am removing myself from the care of pt and pt will now be assigned to the next available physician.     SCRIBE #1 NOTE: I, Conrado Paz, am scribing for, and in the presence of, Werner Stanley MD. I have scribed the entire note.       History     Chief Complaint   Patient presents with    Fatigue     pt reports recent diagnosis and treatment of UTI, pt c/o weakness and feeling like she is "going to pass out"     Review of patient's allergies indicates:   Allergen Reactions    Atenolol      Drops heart rate very low    Hyoscyamine sulfate     Levofloxacin Other (See Comments)     Joint pain  Joint Pain         History of Present Illness     HPI    1/6/2020, 12:49 PM  History obtained from the patient      History of Present Illness: Dawn Gamboa is a 79 y.o. female patient with a h/o CKD and HTN who presents to the Emergency Department for evaluation of fatigue, onset several days PTA. Pt states that she was dx with a UTI 1 week ago, and started on Keflex. Symptoms are constant and moderate in severity. No mitigating or exacerbating factors reported. Associated sxs include a near-syncopal episode 2 days ago. Patient denies any fever, chills, n/v/d, SOB, CP, numbness, dizziness, headache, and all other sxs at this time. No further complaints or concerns at this time.       Arrival mode: Personal transportation     PCP: Nora Hernandez, " MD      Past Medical History:  Past Medical History:   Diagnosis Date    Acid reflux     Arthritis     CKD (chronic kidney disease)     Corneal dystrophy     Hypertension     Thyroid disease     Unspecified disorder of kidney and ureter        Past Surgical History:  Past Surgical History:   Procedure Laterality Date    ANTERIOR CERVICAL DISCECTOMY W/ FUSION      c3-7    BACK SURGERY      CHOLECYSTECTOMY      GALLBLADDER SURGERY      LAMINECTOMY AND MICRODISCECTOMY LUMBAR SPINE      L5-S1         Family History:  History reviewed. No pertinent family history.    Social History:   Social History     Tobacco Use    Smoking status: Never Smoker    Smokeless tobacco: Never Used   Substance and Sexual Activity    Alcohol use: Not Currently    Drug use: Never    Sexual activity: Not on file        Review of Systems     Review of Systems   Constitutional: Positive for fatigue. Negative for chills, diaphoresis and fever.   HENT: Negative for sore throat.    Respiratory: Negative for shortness of breath.    Cardiovascular: Negative for chest pain.   Gastrointestinal: Negative for diarrhea, nausea and vomiting.   Genitourinary: Negative for dysuria.   Musculoskeletal: Negative for back pain.   Skin: Negative for rash and wound.   Neurological: Positive for syncope (near-syncopal episode). Negative for dizziness, weakness, light-headedness, numbness and headaches.   Hematological: Does not bruise/bleed easily.   All other systems reviewed and are negative.     Physical Exam     Initial Vitals [01/06/20 1203]   BP Pulse Resp Temp SpO2   (!) 184/81 91 18 97.5 °F (36.4 °C) 97 %      MAP       --          Physical Exam  Nursing Notes and Vital Signs Reviewed.  Constitutional: Well-developed and well-nourished. No acute distress.  Head: Atraumatic. Normocephalic.  Eyes: PERRL. EOM intact. Conjunctivae are not pale. No scleral icterus.  ENT: Mucous membranes are moist. Oropharynx is clear and symmetric.    Neck:  "Supple. Full ROM. No lymphadenopathy.  Cardiovascular: Regular rate. Regular rhythm. No murmurs, rubs, or gallops. Distal pulses are 2+ and symmetric.  Pulmonary/Chest: No respiratory distress. Clear to auscultation bilaterally. No wheezing or rales.  Abdominal: Soft and non-distended.  There is no tenderness.  No rebound, guarding, or rigidity. Good bowel sounds.  Genitourinary: No CVA tenderness  Musculoskeletal: Moves all extremities. No obvious deformities. No calf tenderness.  Skin: Warm and dry.  Neurological:  Alert, awake, and appropriate.  Normal speech.  No acute focal neurological deficits are appreciated.  Psychiatric: Normal affect. Good eye contact. Appropriate in content.     ED Course   Procedures  ED Vital Signs:  Vitals:    01/06/20 1203 01/06/20 1300 01/06/20 1302 01/06/20 1304   BP: (!) 184/81 (!) 158/70 (!) 201/87 (!) 191/58   Pulse: 91 78 84 90   Resp: 18      Temp: 97.5 °F (36.4 °C)      TempSrc: Oral      SpO2: 97%      Weight: 85.2 kg (187 lb 11.6 oz)      Height: 5' 4" (1.626 m)       01/06/20 1305 01/06/20 1440   BP: (!) 158/70 (!) 147/73   Pulse: 79 81   Resp:  18   Temp:     TempSrc:     SpO2: 99% 99%   Weight:     Height:         Abnormal Lab Results:  Labs Reviewed   CBC W/ AUTO DIFFERENTIAL - Abnormal; Notable for the following components:       Result Value    Hemoglobin 11.9 (*)     Mean Corpuscular Hemoglobin Conc 31.6 (*)     Platelets 370 (*)     MPV 8.8 (*)     Lymph% 16.8 (*)     All other components within normal limits   COMPREHENSIVE METABOLIC PANEL - Abnormal; Notable for the following components:    Sodium 134 (*)     CO2 22 (*)     eGFR if non  54 (*)     All other components within normal limits   URINALYSIS, REFLEX TO URINE CULTURE - Abnormal; Notable for the following components:    Occult Blood UA Trace (*)     All other components within normal limits    Narrative:     Preferred Collection Type->Urine, Clean Catch   TROPONIN I   TSH   MAGNESIUM    "     All Lab Results:  Results for orders placed or performed during the hospital encounter of 01/06/20   CBC auto differential   Result Value Ref Range    WBC 6.25 3.90 - 12.70 K/uL    RBC 4.23 4.00 - 5.40 M/uL    Hemoglobin 11.9 (L) 12.0 - 16.0 g/dL    Hematocrit 37.6 37.0 - 48.5 %    Mean Corpuscular Volume 89 82 - 98 fL    Mean Corpuscular Hemoglobin 28.1 27.0 - 31.0 pg    Mean Corpuscular Hemoglobin Conc 31.6 (L) 32.0 - 36.0 g/dL    RDW 12.9 11.5 - 14.5 %    Platelets 370 (H) 150 - 350 K/uL    MPV 8.8 (L) 9.2 - 12.9 fL    Immature Granulocytes 0.5 0.0 - 0.5 %    Gran # (ANC) 4.5 1.8 - 7.7 K/uL    Immature Grans (Abs) 0.03 0.00 - 0.04 K/uL    Lymph # 1.1 1.0 - 4.8 K/uL    Mono # 0.6 0.3 - 1.0 K/uL    Eos # 0.0 0.0 - 0.5 K/uL    Baso # 0.03 0.00 - 0.20 K/uL    nRBC 0 0 /100 WBC    Gran% 72.2 38.0 - 73.0 %    Lymph% 16.8 (L) 18.0 - 48.0 %    Mono% 9.4 4.0 - 15.0 %    Eosinophil% 0.6 0.0 - 8.0 %    Basophil% 0.5 0.0 - 1.9 %    Differential Method Automated    Comprehensive metabolic panel   Result Value Ref Range    Sodium 134 (L) 136 - 145 mmol/L    Potassium 4.3 3.5 - 5.1 mmol/L    Chloride 100 95 - 110 mmol/L    CO2 22 (L) 23 - 29 mmol/L    Glucose 101 70 - 110 mg/dL    BUN, Bld 13 8 - 23 mg/dL    Creatinine 1.0 0.5 - 1.4 mg/dL    Calcium 9.5 8.7 - 10.5 mg/dL    Total Protein 7.9 6.0 - 8.4 g/dL    Albumin 3.9 3.5 - 5.2 g/dL    Total Bilirubin 0.3 0.1 - 1.0 mg/dL    Alkaline Phosphatase 58 55 - 135 U/L    AST 30 10 - 40 U/L    ALT 16 10 - 44 U/L    Anion Gap 12 8 - 16 mmol/L    eGFR if African American >60 >60 mL/min/1.73 m^2    eGFR if non African American 54 (A) >60 mL/min/1.73 m^2   Urinalysis, Reflex to Urine Culture Urine, Clean Catch   Result Value Ref Range    Specimen UA Urine, Clean Catch     Color, UA Yellow Yellow, Straw, Lorenza    Appearance, UA Clear Clear    pH, UA 7.0 5.0 - 8.0    Specific Gravity, UA 1.010 1.005 - 1.030    Protein, UA Negative Negative    Glucose, UA Negative Negative    Ketones, UA  Negative Negative    Bilirubin (UA) Negative Negative    Occult Blood UA Trace (A) Negative    Nitrite, UA Negative Negative    Urobilinogen, UA Negative <2.0 EU/dL    Leukocytes, UA Negative Negative   Troponin I   Result Value Ref Range    Troponin I 0.006 0.000 - 0.026 ng/mL   TSH   Result Value Ref Range    TSH 0.798 0.400 - 4.000 uIU/mL   Magnesium   Result Value Ref Range    Magnesium 2.1 1.6 - 2.6 mg/dL         Imaging Results    None          The EKG was ordered, reviewed, and independently interpreted by the ED provider.  Interpretation time: 1207  Rate: 88 BPM  Rhythm: NSR  Interpretation: minimal voltage criteria for LVH. No STEMI.      The Emergency Provider reviewed the vital signs and test results, which are outlined above.     ED Discussion     2:37 PM: Reassessed pt at this time. Discussed with pt all pertinent ED information and results. Pt was offered admission, but pt states that she feels better and this time and wants to be discharged home. Discussed pt dx and plan of tx. Gave pt all f/u and return to the ED instructions. All questions and concerns were addressed at this time. Pt expresses understanding of information and instructions, and is comfortable with plan to discharge. Pt is stable for discharge.    I discussed with patient and/or family/caretaker that evaluation in the ED does not suggest any emergent or life threatening medical conditions requiring immediate intervention beyond what was provided in the ED, and I believe patient is safe for discharge.  Regardless, an unremarkable evaluation in the ED does not preclude the development or presence of a serious of life threatening condition. As such, patient was instructed to return immediately for any worsening or change in current symptoms.     MDM        Medical Decision Making:   Clinical Tests:   Lab Tests: Ordered and Reviewed  Medical Tests: Ordered and Reviewed           ED Medication(s):  Medications - No data to  display    Discharge Medication List as of 1/6/2020  2:38 PM          Follow-up Information     Nora Hernandez MD In 2 days.    Specialty:  Internal Medicine  Contact information:  [address] [place] [id number] [phone]                       Scribe Attestation:   Scribe #1: I performed the above scribed service and the documentation accurately describes the services I performed. I attest to the accuracy of the note.     Attending:   Physician Attestation Statement for Scribe #1: I, Werner Stanley MD, personally performed the services described in this documentation, as scribed by Conrado Paz, in my presence, and it is both accurate and complete.           Clinical Impression       ICD-10-CM ICD-9-CM   1. Weakness R53.1 780.79   2. Fatigue R53.83 780.79   3. Benign essential microscopic hematuria R31.1 599.72   4. Essential hypertension I10 401.9       Disposition:   Disposition: Discharged  Condition: Stable         Werner Stanley MD  01/06/20 [id number]

## 2020-01-09 ENCOUNTER — CLINICAL SUPPORT (OUTPATIENT)
Dept: REHABILITATION | Facility: HOSPITAL | Age: 80
End: 2020-01-09
Payer: MEDICARE

## 2020-01-09 DIAGNOSIS — G89.29 CHRONIC PAIN OF LEFT KNEE: Primary | ICD-10-CM

## 2020-01-09 DIAGNOSIS — M25.562 CHRONIC PAIN OF LEFT KNEE: Primary | ICD-10-CM

## 2020-01-09 PROCEDURE — 97014 ELECTRIC STIMULATION THERAPY: CPT

## 2020-01-09 PROCEDURE — 97110 THERAPEUTIC EXERCISES: CPT

## 2020-01-09 PROCEDURE — 97140 MANUAL THERAPY 1/> REGIONS: CPT

## 2020-01-09 NOTE — PROGRESS NOTES
Physical Therapy Daily Treatment Note     Name: Dawn Gamboa  Clinic Number: [id number]    Therapy Diagnosis:   Encounter Diagnosis   Name Primary?    Chronic pain of left knee Yes     Physician: Nora Hernandez MD    Visit Date: 1/9/2020    Physician Orders: PT Eval and Treat    Medical Diagnosis from Referral:   M25.562 (ICD-10-CM) - Left knee pain      Evaluation Date: 12/26/2019  Authorization Period Expiration: 12/12/2020  Plan of Care Expiration: 1/25/2020  Visit # / Visits authorized: 2 / 20     Time In: 11:00 am   Time Out: 12:05 pm   Total Billable Time: 35 minutes     Precautions: Standard         Subjective     Pt reports: she had an issue around New Years with her electrolytes and she ended up in the ED. She reports that is has resolved and she doesn't have severe pain today. Knee does feels some crepitus at timed .States that she has been struggling to do HEP.   She was compliant with home exercise program.  Response to previous treatment: fair  Functional change: none to date    Pain: 4/10  Location: left knee      Objective     Treatment Time In: 11:18 am   Treatment Time Out: 12:05 pm   Total Treatment time separate from Evaluation: 30 minutes     Dawn received therapeutic exercises to develop strength, endurance and ROM for 25 minutes including:  LAQ 3 x 8 3 #    Low bridge 30x   Nu step x 5 mins   bilateral hip abd x 30 to green band   SAQ 3# 3 x 12 each         Dawn received the following manual therapy techniques: Joint mobilizations were applied to the: knee for 10 minutes, including:  Joint distraction mobs , patellar mobs , tibial rotation - all grade 2-3     Dawn received the following supervised modalities after being cleared for contradictions: TENS:  Dawn received TENS electrical stimulation for pain to the left knee. Pt received continuous mode at a rate of 150 pps for 15 minutes. Dawn tolerated treatment well without any adverse effects.      Dawn received  hot pack for 15 minutes to knee with stim.    Home Exercises Provided and Patient Education Provided     Education provided:   - reviewed HEP     Written Home Exercises Provided: Patient instructed to cont prior HEP.  Exercises were reviewed and Dawn was able to demonstrate them prior to the end of the session.  Dawn demonstrated good  understanding of the education provided.     See EMR under Patient Instructions for exercises provided prior visit.    Assessment     Good tolerance to resumption of treatment - modified to have no supine exercise.     Dawn is progressing well towards her goals. But will benefit from continued strengthening of lower quadrant.     Pt prognosis is Good.     Pt will continue to benefit from skilled outpatient physical therapy to address the deficits listed in the problem list box on initial evaluation, provide pt/family education and to maximize pt's level of independence in the home and community environment.     Pt's spiritual, cultural and educational needs considered and pt agreeable to plan of care and goals.     Anticipated barriers to physical therapy: none    Goals:  Short Term Goals: In 3 weeks:  1.I with HEP  2.Patient to demo increased AROM /PROM from current to full extension   3.Patient to report decreased pain subjectively   4.Patient to ambulate about home with minimal pain      Long Term Goals: In 10 weeks  1. Patient to perform daily activities including squatting to lift from floor and cooking/ food prep without limitation.  2. Patient to demonstrate increased knee AROM/PROM to WFL / equal to right .  3. Patient to demonstrate increased LE strength to 5-/5.  4. Patient to have decreased pain to <2/10 at all times .  5. Patient to score less than  35% on the LEFS.        Plan   Plan of care Certification: 12/26/2019 to 1/25/2020.     Outpatient Physical Therapy 2 times weekly for 10 weeks to include the following interventions: Electrical Stimulation prn,  Gait Training, Manual Therapy, Moist Heat/ Ice, Neuromuscular Re-ed, Patient Education, Self Care, Therapeutic Activites and Therapeutic Exercise.       Junaid Yeh, PT

## 2020-01-14 ENCOUNTER — CLINICAL SUPPORT (OUTPATIENT)
Dept: REHABILITATION | Facility: HOSPITAL | Age: 80
End: 2020-01-14
Payer: MEDICARE

## 2020-01-14 DIAGNOSIS — M25.562 CHRONIC PAIN OF LEFT KNEE: Primary | ICD-10-CM

## 2020-01-14 DIAGNOSIS — G89.29 CHRONIC PAIN OF LEFT KNEE: Primary | ICD-10-CM

## 2020-01-14 PROCEDURE — 97140 MANUAL THERAPY 1/> REGIONS: CPT

## 2020-01-14 PROCEDURE — 97110 THERAPEUTIC EXERCISES: CPT

## 2020-01-14 PROCEDURE — 97014 ELECTRIC STIMULATION THERAPY: CPT

## 2020-01-14 NOTE — PROGRESS NOTES
Physical Therapy Daily Treatment Note     Name: Dawn Gamboa  Clinic Number: [id number]    Therapy Diagnosis:   Encounter Diagnosis   Name Primary?    Chronic pain of left knee Yes     Physician: Nora Hernandez MD    Visit Date: 1/14/2020    Physician Orders: PT Eval and Treat    Medical Diagnosis from Referral:   M25.562 (ICD-10-CM) - Left knee pain      Evaluation Date: 12/26/2019  Authorization Period Expiration: 12/12/2020  Plan of Care Expiration: 1/25/2020  Visit # / Visits authorized: 4 / 20     Time In: 11:00 am   Time Out: 12:05 pm   Total Billable Time: 35 minutes     Precautions: Standard         Subjective     Pt reports: she is doing well. Is working on home program regularly.   She was compliant with home exercise program.  Response to previous treatment: fair  Functional change: none to date    Pain: 4/10  Location: left knee      Objective     Treatment Time In: 11:18 am   Treatment Time Out: 12:05 pm   Total Treatment time separate from Evaluation: 30 minutes     Dawn received therapeutic exercises to develop strength, endurance and ROM for 30 minutes including:  LAQ 3 x 15 with 10#    Low bridge 30x   Nu step x 15 mins   SLR 3 x 10    SAQ 3# 3 x 12 each         Dawn received the following manual therapy techniques: Joint mobilizations were applied to the: knee for 10 minutes, including:  Joint distraction mobs , patellar mobs , tibial rotation - all grade 2-3     Dawn received the following supervised modalities after being cleared for contradictions: TENS:  Dawn received TENS electrical stimulation for pain to the left knee. Pt received continuous mode at a rate of 150 pps for 15 minutes. Dawn tolerated treatment well without any adverse effects.      Dawn received hot pack for 15 minutes to knee with stim.    Home Exercises Provided and Patient Education Provided     Education provided:   - reviewed HEP     Written Home Exercises Provided: Patient instructed to  cont prior HEP.  Exercises were reviewed and Dawn was able to demonstrate them prior to the end of the session.  Dawn demonstrated good  understanding of the education provided.     See EMR under Patient Instructions for exercises provided prior visit.    Assessment     Good tolerance to exercise performed in long sit / reclined plinth . Focused on eccentric control with LAQ - progressed with time on Nu step and resistance. Will benefit from continued strengthening of hip and lower extremity. Limitations with supine exercise limit options for exercise.     Dawn is progressing well towards her goals. But will benefit from continued strengthening of lower quadrant.     Pt prognosis is Good.     Pt will continue to benefit from skilled outpatient physical therapy to address the deficits listed in the problem list box on initial evaluation, provide pt/family education and to maximize pt's level of independence in the home and community environment.     Pt's spiritual, cultural and educational needs considered and pt agreeable to plan of care and goals.     Anticipated barriers to physical therapy: none    Goals:  Short Term Goals: In 3 weeks:  1.I with HEP  2.Patient to demo increased AROM /PROM from current to full extension   3.Patient to report decreased pain subjectively   4.Patient to ambulate about home with minimal pain      Long Term Goals: In 10 weeks  1. Patient to perform daily activities including squatting to lift from floor and cooking/ food prep without limitation.  2. Patient to demonstrate increased knee AROM/PROM to WFL / equal to right .  3. Patient to demonstrate increased LE strength to 5-/5.  4. Patient to have decreased pain to <2/10 at all times .  5. Patient to score less than  35% on the LEFS.        Plan   Plan of care Certification: 12/26/2019 to 1/25/2020.     Outpatient Physical Therapy 2 times weekly for 10 weeks to include the following interventions: Electrical Stimulation  prn, Gait Training, Manual Therapy, Moist Heat/ Ice, Neuromuscular Re-ed, Patient Education, Self Care, Therapeutic Activites and Therapeutic Exercise.       Junaid Yeh, PT

## 2020-01-16 ENCOUNTER — CLINICAL SUPPORT (OUTPATIENT)
Dept: REHABILITATION | Facility: HOSPITAL | Age: 80
End: 2020-01-16
Payer: MEDICARE

## 2020-01-16 DIAGNOSIS — G89.29 CHRONIC PAIN OF LEFT KNEE: Primary | ICD-10-CM

## 2020-01-16 DIAGNOSIS — M25.562 CHRONIC PAIN OF LEFT KNEE: Primary | ICD-10-CM

## 2020-01-16 PROCEDURE — 97014 ELECTRIC STIMULATION THERAPY: CPT

## 2020-01-16 PROCEDURE — 97110 THERAPEUTIC EXERCISES: CPT

## 2020-01-16 NOTE — PROGRESS NOTES
Physical Therapy Daily Treatment Note     Name: Dawn Gamboa  Clinic Number: [id number]    Therapy Diagnosis:   Encounter Diagnosis   Name Primary?    Chronic pain of left knee Yes     Physician: Nora Hernandez MD    Visit Date: 1/16/2020    Physician Orders: PT Eval and Treat    Medical Diagnosis from Referral:   M25.562 (ICD-10-CM) - Left knee pain      Evaluation Date: 12/26/2019  Authorization Period Expiration: 12/12/2020  Plan of Care Expiration: 1/25/2020  Visit # / Visits authorized: 5 / 20     Time In: 11:00 am   Time Out: 12:05 pm   Total Billable Time: 40 minutes     Precautions: Standard         Subjective     Pt reports: she was a little sore after last session but has settled . Is working on home program regularly and was on feet a lot the other day and feels that may have contributed to the increased aching .    She was compliant with home exercise program.  Response to previous treatment: fair  Functional change: none to date    Pain: 4/10  Location: left knee      Objective     Treatment Time In: 11:00am   Treatment Time Out: 12:00 pm   Total Treatment time separate from Evaluation: 30 minutes     Dawn received therapeutic exercises to develop strength, endurance and ROM for 35 minutes including:  LAQ 3 x 15 with 5#    Nu step x 10 mins   Hip flexion to green band 3 x 10 each   Hip abd/ext (ferbers) in bars 3 x 10 each   Hamstring curls to green band 3 x 15   Tailgaters with 5 #    Step ups 3 x 10 each      Dawn received the following manual therapy techniques: Joint mobilizations were applied to the: knee for 5 minutes, including:  Joint distraction mobs , patellar mobs , tibial rotation - all grade 2-3     Dawn received the following supervised modalities after being cleared for contradictions: TENS:  Dawn received TENS electrical stimulation for pain to the left knee. Pt received continuous mode at a rate of 150 pps for 15 minutes. Dawn tolerated treatment well  without any adverse effects.      Dawn received hot pack for 15 minutes to knee with stim.    Home Exercises Provided and Patient Education Provided     Education provided:   - reviewed HEP     Written Home Exercises Provided: Patient instructed to cont prior HEP.  Exercises were reviewed and Dawn was able to demonstrate them prior to the end of the session.  Dawn demonstrated good  understanding of the education provided.     See EMR under Patient Instructions for exercises provided prior visit.    Assessment     Good tolerance to progression of hip exercises / strengthening . Will benefit from continued strengthening of hip and lower extremity. Limitations with supine exercise limit options for exercise. Excellent tolerance to standing exercise - stairs and standing hip exercise    Dawn is progressing well towards her goals. But will benefit from continued strengthening of lower quadrant.     Pt prognosis is Good.     Pt will continue to benefit from skilled outpatient physical therapy to address the deficits listed in the problem list box on initial evaluation, provide pt/family education and to maximize pt's level of independence in the home and community environment.     Pt's spiritual, cultural and educational needs considered and pt agreeable to plan of care and goals.     Anticipated barriers to physical therapy: none    Goals:  Short Term Goals: In 3 weeks:  1.I with HEP  2.Patient to demo increased AROM /PROM from current to full extension   3.Patient to report decreased pain subjectively   4.Patient to ambulate about home with minimal pain      Long Term Goals: In 10 weeks  1. Patient to perform daily activities including squatting to lift from floor and cooking/ food prep without limitation.  2. Patient to demonstrate increased knee AROM/PROM to WFL / equal to right .  3. Patient to demonstrate increased LE strength to 5-/5.  4. Patient to have decreased pain to <2/10 at all times .  5.  Patient to score less than  35% on the LEFS.        Plan   Plan of care Certification: 12/26/2019 to 1/25/2020.     Outpatient Physical Therapy 2 times weekly for 10 weeks to include the following interventions: Electrical Stimulation prn, Gait Training, Manual Therapy, Moist Heat/ Ice, Neuromuscular Re-ed, Patient Education, Self Care, Therapeutic Activites and Therapeutic Exercise.       Junaid Yeh, PT

## 2020-01-21 ENCOUNTER — CLINICAL SUPPORT (OUTPATIENT)
Dept: REHABILITATION | Facility: HOSPITAL | Age: 80
End: 2020-01-21
Payer: MEDICARE

## 2020-01-21 DIAGNOSIS — M25.562 CHRONIC PAIN OF LEFT KNEE: Primary | ICD-10-CM

## 2020-01-21 DIAGNOSIS — G89.29 CHRONIC PAIN OF LEFT KNEE: Primary | ICD-10-CM

## 2020-01-21 PROCEDURE — 97110 THERAPEUTIC EXERCISES: CPT

## 2020-01-21 PROCEDURE — 97014 ELECTRIC STIMULATION THERAPY: CPT

## 2020-01-21 NOTE — PROGRESS NOTES
Physical Therapy Daily Treatment Note     Name: Dawn Gamboa  Clinic Number: [id number]    Therapy Diagnosis:   Encounter Diagnosis   Name Primary?    Chronic pain of left knee Yes     Physician: Nora Hernandez MD    Visit Date: 1/21/2020    Physician Orders: PT Eval and Treat    Medical Diagnosis from Referral:   M25.562 (ICD-10-CM) - Left knee pain      Evaluation Date: 12/26/2019  Authorization Period Expiration: 12/12/2020  Plan of Care Expiration: 1/25/2020  Visit # / Visits authorized: 5 / 20     Time In: 11:00 am   Time Out: 12:05 pm   Total Billable Time: 40 minutes     Precautions: Standard    Subjective     Pt reports: she has had some increased pain in her left foot - began when she was out walking . Describes it as in 3-4th MET heads . Knee has been achy but doing overall well with exercises.   She was compliant with home exercise program.  Response to previous treatment: fair  Functional change: none to date    Pain: 4/10  Location: left knee      Objective     Treatment Time In: 11:00 am   Treatment Time Out: 12:05 pm   Total Treatment time separate from Evaluation: 30 minutes     Dawn received therapeutic exercises to develop strength, endurance and ROM for 35 minutes including:    LAQ 3 x 15 with 5#    Recumbent bike  x 10 mins   Hip flexion to blue band 3 x 10 each   Hip abd/ext in bars 3 x 10 each bilaterally   Tailgaters with 5 #    Matrix hip add 55# 2 x 10 / 45# 1 x 10      Dawn received the following manual therapy techniques: Joint mobilizations were applied to the: knee for 5 minutes, including:  Joint distraction mobs , patellar mobs , tibial rotation - all grade 2-3     Dawn received the following supervised modalities after being cleared for contradictions: TENS:  Dawn received TENS electrical stimulation for pain to the left knee. Pt received continuous mode at a rate of 150 pps for 15 minutes. Dawn tolerated treatment well without any adverse effects.       Dawn received hot pack for 15 minutes to knee with stim.    Home Exercises Provided and Patient Education Provided     Education provided:   - reviewed HEP     Written Home Exercises Provided: Patient instructed to cont prior HEP.  Exercises were reviewed and Dawn was able to demonstrate them prior to the end of the session.  Dawn demonstrated good  understanding of the education provided.     See EMR under Patient Instructions for exercises provided prior visit.    Assessment     Good tolerance to progression of hip exercises / addition of hip adductor strengthening . Will benefit from continued strengthening of hip and lower extremity.     Dawn is progressing well towards her goals. But will benefit from continued strengthening of lower quadrant.     Pt prognosis is Good.     Pt will continue to benefit from skilled outpatient physical therapy to address the deficits listed in the problem list box on initial evaluation, provide pt/family education and to maximize pt's level of independence in the home and community environment.     Pt's spiritual, cultural and educational needs considered and pt agreeable to plan of care and goals.     Anticipated barriers to physical therapy: none    Goals:  Short Term Goals: In 3 weeks:  1.I with HEP  2.Patient to demo increased AROM /PROM from current to full extension   3.Patient to report decreased pain subjectively   4.Patient to ambulate about home with minimal pain      Long Term Goals: In 10 weeks  1. Patient to perform daily activities including squatting to lift from floor and cooking/ food prep without limitation.  2. Patient to demonstrate increased knee AROM/PROM to WFL / equal to right .  3. Patient to demonstrate increased LE strength to 5-/5.  4. Patient to have decreased pain to <2/10 at all times .  5. Patient to score less than  35% on the LEFS.        Plan   Plan of care Certification: 12/26/2019 to 1/25/2020.     Outpatient Physical  Therapy 2 times weekly for 10 weeks to include the following interventions: Electrical Stimulation prn, Gait Training, Manual Therapy, Moist Heat/ Ice, Neuromuscular Re-ed, Patient Education, Self Care, Therapeutic Activites and Therapeutic Exercise.       Junaid Yeh, PT

## 2020-01-23 ENCOUNTER — CLINICAL SUPPORT (OUTPATIENT)
Dept: REHABILITATION | Facility: HOSPITAL | Age: 80
End: 2020-01-23
Payer: MEDICARE

## 2020-01-23 DIAGNOSIS — G89.29 CHRONIC PAIN OF LEFT KNEE: Primary | ICD-10-CM

## 2020-01-23 DIAGNOSIS — M25.562 CHRONIC PAIN OF LEFT KNEE: Primary | ICD-10-CM

## 2020-01-23 PROCEDURE — 97014 ELECTRIC STIMULATION THERAPY: CPT

## 2020-01-23 PROCEDURE — 97110 THERAPEUTIC EXERCISES: CPT

## 2020-01-23 NOTE — PROGRESS NOTES
Physical Therapy Daily Treatment Note     Name: Dawn Gamboa  Clinic Number: [id number]    Therapy Diagnosis:   Encounter Diagnosis   Name Primary?    Chronic pain of left knee Yes     Physician: Nora Hernandez MD    Visit Date: 1/23/2020    Physician Orders: PT Eval and Treat    Medical Diagnosis from Referral:   M25.562 (ICD-10-CM) - Left knee pain      Evaluation Date: 12/26/2019  Authorization Period Expiration: 12/12/2020  Plan of Care Expiration: 1/25/2020  Visit # / Visits authorized: 6 / 20     Time In: 11:00 am   Time Out: 12:05 pm   Total Billable Time: 40 minutes     Precautions: Standard    Subjective     Pt reports: she has been doing well overall and with her home exercises.   She was compliant with home exercise program.  Response to previous treatment: fair  Functional change: none to date    Pain: 4/10  Location: left knee      Objective     Treatment Time In: 11:00 am   Treatment Time Out: 12:05 pm      Dawn received therapeutic exercises to develop strength, endurance and ROM for 40 minutes including:    LAQ 3 x 15 with 5#    Recumbent bike  x 10 mins   Hip flexion to blue band 3 x 10 each   Hip abd/ext in bars 3 x 10 each bilaterally   Tailgaters with 5 #    Matrix hip add 25# 3 x 10    Matrix hip abd 30# 3 x 10      Dawn received the following manual therapy techniques: Joint mobilizations were applied to the: knee for 5 minutes, including:  Joint distraction mobs , patellar mobs , tibial rotation - all grade 2-3     Dawn received the following supervised modalities after being cleared for contradictions: TENS:  Dawn received TENS electrical stimulation for pain to the left knee. Pt received continuous mode at a rate of 150 pps for 15 minutes. Dawn tolerated treatment well without any adverse effects.      Dawn received hot pack for 15 minutes to knee with stim.    Home Exercises Provided and Patient Education Provided     Education provided:   - reviewed HEP      Written Home Exercises Provided: Patient instructed to cont prior HEP.  Exercises were reviewed and Dawn was able to demonstrate them prior to the end of the session.  Dawn demonstrated good  understanding of the education provided.     See EMR under Patient Instructions for exercises provided prior visit.    Assessment     Excellent tolerance to all exercises and addition of hip abductor strengthening . Will benefit from continued strengthening of hip and lower extremity.     Dawn is progressing well towards her goals. But will benefit from continued strengthening of lower quadrant.     Pt prognosis is Good.     Pt will continue to benefit from skilled outpatient physical therapy to address the deficits listed in the problem list box on initial evaluation, provide pt/family education and to maximize pt's level of independence in the home and community environment.     Pt's spiritual, cultural and educational needs considered and pt agreeable to plan of care and goals.     Anticipated barriers to physical therapy: none    Goals:  Short Term Goals: In 3 weeks:  1.I with HEP  2.Patient to demo increased AROM /PROM from current to full extension   3.Patient to report decreased pain subjectively   4.Patient to ambulate about home with minimal pain      Long Term Goals: In 10 weeks  1. Patient to perform daily activities including squatting to lift from floor and cooking/ food prep without limitation.  2. Patient to demonstrate increased knee AROM/PROM to WFL / equal to right .  3. Patient to demonstrate increased LE strength to 5-/5.  4. Patient to have decreased pain to <2/10 at all times .  5. Patient to score less than  35% on the LEFS.        Plan   Plan of care Certification: 12/26/2019 to 1/25/2020.     Outpatient Physical Therapy 2 times weekly for 10 weeks to include the following interventions: Electrical Stimulation prn, Gait Training, Manual Therapy, Moist Heat/ Ice, Neuromuscular Re-ed,  Patient Education, Self Care, Therapeutic Activites and Therapeutic Exercise.       Junaid Yeh, PT

## 2020-01-28 ENCOUNTER — CLINICAL SUPPORT (OUTPATIENT)
Dept: REHABILITATION | Facility: HOSPITAL | Age: 80
End: 2020-01-28
Payer: MEDICARE

## 2020-01-28 DIAGNOSIS — M25.562 CHRONIC PAIN OF LEFT KNEE: Primary | ICD-10-CM

## 2020-01-28 DIAGNOSIS — G89.29 CHRONIC PAIN OF LEFT KNEE: Primary | ICD-10-CM

## 2020-01-28 PROCEDURE — 97014 ELECTRIC STIMULATION THERAPY: CPT

## 2020-01-28 PROCEDURE — 97110 THERAPEUTIC EXERCISES: CPT

## 2020-01-28 NOTE — PLAN OF CARE
Physical Therapy Daily Treatment Note  UPDATED PLAN OF CARE      Name: Dawn Gamboa  Clinic Number: [id number]    Therapy Diagnosis:   Encounter Diagnosis   Name Primary?    Chronic pain of left knee Yes     Physician: Nora Hernandez MD    Visit Date: 1/28/2020    Physician Orders: PT Eval and Treat    Medical Diagnosis from Referral:   M25.562 (ICD-10-CM) - Left knee pain      Evaluation Date: 12/26/2019  Authorization Period Expiration: 12/12/2020  Plan of Care Expiration: 2/27/2020  Visit # / Visits authorized: 8 / 20     Time In: 11:00 am   Time Out: 12:05 pm   Total Billable Time: 40 minutes     Precautions: Standard    Subjective     Pt reports: she has been doing well overall with her home exercises however continues to have lateral pain. Is tender to touch and some increased pain with WB / squatting and when spending increased time on feet .    She was compliant with home exercise program.  Response to previous treatment: fair  Functional change: none to date    Pain: 4/10  Location: left knee      Objective     Treatment Time In: 11:00 am   Treatment Time Out: 12:05 pm        Gait: incresaed left tibial external rotation      Squat: Double leg - genu valgus      Balance: WFL with gait / bilateral stance , mild derease on SLS      Knee AROM:                                                  (L)        (R)                                      Flexion                         125      128                                      Extension                      -2          0  Knee PROM               Flexion                         130      132                                      Extension                        0          3                  Strength:                   Hip flexors                   4-/5     4-/5  Quadriceps                  5/5       5/5                                             Hamstrings                  5/5       5/5                                      Anterior Tibialis           5/5        5/5                                      Peroneals                    5/5       5/5                                      External rotators         4/5       4/5                                      Gluteus Medius           4/5       4/5                                      Gluteus Cheko        4/5       4/5     Joint Mobility:  Mild hypomobility femoral-tibial joint      Tenderness to palpation: Tender to palpate along lateral joint line of left knee at superficial to moderate depth.     Special Test:              Anterior Drawer           neg      Posterior Drawer         neg                                      Lachman                     neg      Post Lachman             neg                                      Valgus stress              neg      Varus stress                neg                                      Jayden                    neg      Thessaly                      neg                                         ____________________________________________________________________________________________________    Dawn received therapeutic exercises to develop strength, endurance and ROM for 40 minutes including:    LAQ 3 x 15 with 5#    Recumbent bike  x 10 mins   Hip flexion to blue band 3 x 10 each   Hip abd/ext in bars 3 x 10 each bilaterally   Tailgaters with 5 #    Matrix hip add 25# 3 x 10    Matrix hip abd 30# 3 x 10      Dawn received the following manual therapy techniques: Joint mobilizations were applied to the: knee for 5 minutes, including:  Joint distraction mobs , patellar mobs , tibial rotation - all grade 2-3     Dawn received the following supervised modalities after being cleared for contradictions: TENS:  Dawn received TENS electrical stimulation for pain to the left knee. Pt received continuous mode at a rate of 150 pps for 15 minutes. Dawn tolerated treatment well without any adverse effects.      Dawn received hot pack for 15 minutes to knee with  stim.    Home Exercises Provided and Patient Education Provided     Education provided:   - reviewed HEP     Written Home Exercises Provided: Patient instructed to cont prior HEP.  Exercises were reviewed and Dawn was able to demonstrate them prior to the end of the session.  Dawn demonstrated good  understanding of the education provided.     See EMR under Patient Instructions for exercises provided prior visit.    Assessment     Good effort with all treatment to date and reports and demonstrates some improvement , however persistent pain laterally over knee joint line. May benefit from continued strengthening about knee joint , however gains to date have been minimal .     Dawn is progressing well towards her goals. But will benefit from continued strengthening of lower quadrant.     Pt prognosis is Good.     Pt will continue to benefit from skilled outpatient physical therapy to address the deficits listed in the problem list box on initial evaluation, provide pt/family education and to maximize pt's level of independence in the home and community environment.     Pt's spiritual, cultural and educational needs considered and pt agreeable to plan of care and goals.     Anticipated barriers to physical therapy: none    Goals:  Short Term Goals: In 3 weeks:  1.I with HEP          MET   2.Patient to demo increased AROM /PROM from current to full extension   ALMOST MET  3.Patient to report decreased pain subjectively      MET  4.Patient to ambulate about home with minimal pain     PARTIALLY MET     Long Term Goals: In 10 weeks  1. Patient to perform daily activities including squatting to lift from floor and cooking/ food prep without limitation. IN PROGRESS  2. Patient to demonstrate increased knee AROM/PROM to WFL / equal to right .     IN PROGRESS  3. Patient to demonstrate increased LE strength to 5-/5.        IN PROGRESS  4. Patient to have decreased pain to <2/10 at all times .        IN PROGRESS  5.  Patient to score less than  35% on the LEFS.         NOT MET         Plan   Plan of care Certification: 1/28/2020 to 2/27/2020     Outpatient Physical Therapy 2 times weekly for 4 weeks to include the following interventions: Electrical Stimulation prn, Gait Training, Manual Therapy, Moist Heat/ Ice, Neuromuscular Re-ed, Patient Education, Self Care, Therapeutic Activites and Therapeutic Exercise.       Junaid Yeh, PT          [Negative] : Genitourinary

## 2020-01-28 NOTE — PROGRESS NOTES
Physical Therapy Daily Treatment Note  UPDATED PLAN OF CARE      Name: Dawn Gamboa  Clinic Number: [id number]    Therapy Diagnosis:   Encounter Diagnosis   Name Primary?    Chronic pain of left knee Yes     Physician: Nora Hernandez MD    Visit Date: 1/28/2020    Physician Orders: PT Eval and Treat    Medical Diagnosis from Referral:   M25.562 (ICD-10-CM) - Left knee pain      Evaluation Date: 12/26/2019  Authorization Period Expiration: 12/12/2020  Plan of Care Expiration: 2/27/2020  Visit # / Visits authorized: 8 / 20     Time In: 11:00 am   Time Out: 12:05 pm   Total Billable Time: 40 minutes     Precautions: Standard    Subjective     Pt reports: she has been doing well overall with her home exercises however continues to have lateral pain. Is tender to touch and some increased pain with WB / squatting and when spending increased time on feet .    She was compliant with home exercise program.  Response to previous treatment: fair  Functional change: none to date    Pain: 4/10  Location: left knee      Objective     Treatment Time In: 11:00 am   Treatment Time Out: 12:05 pm        Gait: incresaed left tibial external rotation      Squat: Double leg - genu valgus      Balance: WFL with gait / bilateral stance , mild derease on SLS      Knee AROM:                                                  (L)        (R)                                      Flexion                         125      128                                      Extension                      -2          0  Knee PROM               Flexion                         130      132                                      Extension                        0          3                  Strength:                   Hip flexors                   4-/5     4-/5  Quadriceps                  5/5       5/5                                             Hamstrings                  5/5       5/5                                      Anterior Tibialis           5/5        5/5                                      Peroneals                    5/5       5/5                                      External rotators         4/5       4/5                                      Gluteus Medius           4/5       4/5                                      Gluteus Cheko        4/5       4/5     Joint Mobility:  Mild hypomobility femoral-tibial joint      Tenderness to palpation: Tender to palpate along lateral joint line of left knee at superficial to moderate depth.     Special Test:              Anterior Drawer           neg      Posterior Drawer         neg                                      Lachman                     neg      Post Lachman             neg                                      Valgus stress              neg      Varus stress                neg                                      Jayden                    neg      Thessaly                      neg                                         ____________________________________________________________________________________________________    Dawn received therapeutic exercises to develop strength, endurance and ROM for 40 minutes including:    LAQ 3 x 15 with 5#    Recumbent bike  x 10 mins   Hip flexion to blue band 3 x 10 each   Hip abd/ext in bars 3 x 10 each bilaterally   Tailgaters with 5 #    Matrix hip add 25# 3 x 10    Matrix hip abd 30# 3 x 10      Dawn received the following manual therapy techniques: Joint mobilizations were applied to the: knee for 5 minutes, including:  Joint distraction mobs , patellar mobs , tibial rotation - all grade 2-3     Dawn received the following supervised modalities after being cleared for contradictions: TENS:  Dawn received TENS electrical stimulation for pain to the left knee. Pt received continuous mode at a rate of 150 pps for 15 minutes. Dawn tolerated treatment well without any adverse effects.      Dawn received hot pack for 15 minutes to knee with  stim.    Home Exercises Provided and Patient Education Provided     Education provided:   - reviewed HEP     Written Home Exercises Provided: Patient instructed to cont prior HEP.  Exercises were reviewed and Dawn was able to demonstrate them prior to the end of the session.  Dawn demonstrated good  understanding of the education provided.     See EMR under Patient Instructions for exercises provided prior visit.    Assessment     Good effort with all treatment to date and reports and demonstrates some improvement , however persistent pain laterally over knee joint line.      Dawn is progressing well towards her goals. But will benefit from continued strengthening of lower quadrant.     Pt prognosis is Good.     Pt will continue to benefit from skilled outpatient physical therapy to address the deficits listed in the problem list box on initial evaluation, provide pt/family education and to maximize pt's level of independence in the home and community environment.     Pt's spiritual, cultural and educational needs considered and pt agreeable to plan of care and goals.     Anticipated barriers to physical therapy: none    Goals:  Short Term Goals: In 3 weeks:  1.I with HEP          MET   2.Patient to demo increased AROM /PROM from current to full extension   ALMOST MET  3.Patient to report decreased pain subjectively      MET  4.Patient to ambulate about home with minimal pain     PARTIALLY MET     Long Term Goals: In 10 weeks  1. Patient to perform daily activities including squatting to lift from floor and cooking/ food prep without limitation. IN PROGRESS  2. Patient to demonstrate increased knee AROM/PROM to WFL / equal to right .     IN PROGRESS  3. Patient to demonstrate increased LE strength to 5-/5.        IN PROGRESS  4. Patient to have decreased pain to <2/10 at all times .        IN PROGRESS  5. Patient to score less than  35% on the LEFS.         NOT MET         Plan   Plan of care  "Certification: 1/28/2020 to 2/27/2020     Outpatient Physical Therapy 2 times weekly for 4 weeks to include the following interventions: Electrical Stimulation prn, Gait Training, Manual Therapy, Moist Heat/ Ice, Neuromuscular Re-ed, Patient Education, Self Care, Therapeutic Activites and Therapeutic Exercise.       Junaid Yeh, PT         "I certify the need for these services furnished under this plan of treatment and while under my care."    ____________________________________  Physician/Referring Practitioner    _______________  Date of Signature      "

## 2020-01-30 ENCOUNTER — CLINICAL SUPPORT (OUTPATIENT)
Dept: REHABILITATION | Facility: HOSPITAL | Age: 80
End: 2020-01-30
Payer: MEDICARE

## 2020-01-30 DIAGNOSIS — M25.562 CHRONIC PAIN OF LEFT KNEE: Primary | ICD-10-CM

## 2020-01-30 DIAGNOSIS — G89.29 CHRONIC PAIN OF LEFT KNEE: Primary | ICD-10-CM

## 2020-01-30 PROCEDURE — 97014 ELECTRIC STIMULATION THERAPY: CPT

## 2020-01-30 PROCEDURE — 97110 THERAPEUTIC EXERCISES: CPT

## 2020-01-30 NOTE — PROGRESS NOTES
Physical Therapy Daily Treatment Note  UPDATED PLAN OF CARE      Name: Dawn Gamboa  Clinic Number: [id number]    Therapy Diagnosis:   Encounter Diagnosis   Name Primary?    Chronic pain of left knee Yes     Physician: Nora Hernandez MD    Visit Date: 1/30/2020    Physician Orders: PT Eval and Treat    Medical Diagnosis from Referral:   M25.562 (ICD-10-CM) - Left knee pain      Evaluation Date: 12/26/2019  Authorization Period Expiration: 12/12/2020  Plan of Care Expiration: 2/27/2020  Visit # / Visits authorized: 8 / 20     Time In: 11:00 am   Time Out: 12:05 pm   Total Billable Time: 40 minutes     Precautions: Standard    Subjective     Pt reports: she continues to have significant tendeness over the lateral left knee. Is bad with walking and is quite tender.   She was compliant with home exercise program.  Response to previous treatment: fair  Functional change: none to date    Pain: 4/10  Location: left knee      Objective     Tender to palpation over lateral joint line   Valgus deformity left knee    Dawn received therapeutic exercises to develop strength, endurance and ROM for 40 minutes including:    LAQ 3 x 15 with 5#    Recumbent bike  x 10 mins   Hip flexion to blue band 3 x 10 each   Hip abd/ext in bars 3 x 10 each bilaterally   Tailgaters with 5 #    Matrix hip add 25# 3 x 10    Matrix hip abd 30# 3 x 10      Dawn received the following manual therapy techniques: Joint mobilizations were applied to the: knee for 5 minutes, including:  Joint distraction mobs , patellar mobs , tibial rotation - all grade 2-3     Dawn received the following supervised modalities after being cleared for contradictions: TENS:  Dawn received TENS electrical stimulation for pain to the left knee. Pt received continuous mode at a rate of 150 pps for 15 minutes. Dawn tolerated treatment well without any adverse effects.      Dawn received hot pack for 15 minutes to knee with stim.    Home  Exercises Provided and Patient Education Provided     Education provided:   - reviewed HEP     Written Home Exercises Provided: Patient instructed to cont prior HEP.  Exercises were reviewed and Dawn was able to demonstrate them prior to the end of the session.  Dawn demonstrated good  understanding of the education provided.     See EMR under Patient Instructions for exercises provided prior visit.    Assessment     Persistent pain laterally over knee joint line.  No ligamentous laxity on stress testing of knee and negative meniscal testing. Pain likely related to arthritic changes and will need long term strengthening consistently to obtain any clinically relevant change.     Dawn is progressing fair towards her goals. Will benefit from continued strengthening of lower quadrant.     Pt prognosis is Fair.     Pt will continue to benefit from skilled outpatient physical therapy to address the deficits listed in the problem list box on initial evaluation, provide pt/family education and to maximize pt's level of independence in the home and community environment.     Pt's spiritual, cultural and educational needs considered and pt agreeable to plan of care and goals.     Anticipated barriers to physical therapy: none    Goals:  Short Term Goals: In 3 weeks:  1.I with HEP          MET   2.Patient to demo increased AROM /PROM from current to full extension   ALMOST MET  3.Patient to report decreased pain subjectively      MET  4.Patient to ambulate about home with minimal pain     PARTIALLY MET     Long Term Goals: In 10 weeks  1. Patient to perform daily activities including squatting to lift from floor and cooking/ food prep without limitation. IN PROGRESS  2. Patient to demonstrate increased knee AROM/PROM to WFL / equal to right .     IN PROGRESS  3. Patient to demonstrate increased LE strength to 5-/5.        IN PROGRESS  4. Patient to have decreased pain to <2/10 at all times .        IN PROGRESS  5.  Patient to score less than  35% on the LEFS.         NOT MET         Plan   Plan of care Certification: 1/28/2020 to 2/27/2020     Outpatient Physical Therapy 2 times weekly for 4 weeks to include the following interventions: Electrical Stimulation prn, Gait Training, Manual Therapy, Moist Heat/ Ice, Neuromuscular Re-ed, Patient Education, Self Care, Therapeutic Activites and Therapeutic Exercise.       Junaid Yeh, PT

## 2020-03-02 ENCOUNTER — HOSPITAL ENCOUNTER (OUTPATIENT)
Dept: RADIOLOGY | Facility: HOSPITAL | Age: 80
Discharge: HOME | End: 2020-03-02
Attending: INTERNAL MEDICINE
Payer: MEDICARE

## 2020-03-02 DIAGNOSIS — R91.8 MULTIPLE LUNG NODULES: ICD-10-CM

## 2020-03-02 DIAGNOSIS — R91.8 MULTIPLE LUNG NODULES: Chronic | ICD-10-CM

## 2020-03-02 PROCEDURE — 71250 CT THORAX DX C-: CPT | Mod: TC

## 2020-03-11 ENCOUNTER — OFFICE VISIT (OUTPATIENT)
Dept: PULMONOLOGY | Facility: CLINIC | Age: 80
End: 2020-03-11
Payer: MEDICARE

## 2020-03-11 VITALS
TEMPERATURE: 98 F | HEIGHT: 64 IN | BODY MASS INDEX: 31.59 KG/M2 | DIASTOLIC BLOOD PRESSURE: 68 MMHG | SYSTOLIC BLOOD PRESSURE: 140 MMHG | WEIGHT: 185.06 LBS | RESPIRATION RATE: 18 BRPM | OXYGEN SATURATION: 98 % | HEART RATE: 77 BPM

## 2020-03-11 DIAGNOSIS — R91.8 MULTIPLE LUNG NODULES: Primary | Chronic | ICD-10-CM

## 2020-03-11 PROCEDURE — 99999 PR PBB SHADOW E&M-EST. PATIENT-LVL III: ICD-10-PCS | Mod: PBBFAC,,, | Performed by: INTERNAL MEDICINE

## 2020-03-11 PROCEDURE — 99213 OFFICE O/P EST LOW 20 MIN: CPT | Mod: PBBFAC | Performed by: INTERNAL MEDICINE

## 2020-03-11 PROCEDURE — 99214 PR OFFICE/OUTPT VISIT, EST, LEVL IV, 30-39 MIN: ICD-10-PCS | Mod: S$PBB,,, | Performed by: INTERNAL MEDICINE

## 2020-03-11 PROCEDURE — 99214 OFFICE O/P EST MOD 30 MIN: CPT | Mod: S$PBB,,, | Performed by: INTERNAL MEDICINE

## 2020-03-11 PROCEDURE — 99999 PR PBB SHADOW E&M-EST. PATIENT-LVL III: CPT | Mod: PBBFAC,,, | Performed by: INTERNAL MEDICINE

## 2020-03-11 RX ORDER — MUPIROCIN 20 MG/G
OINTMENT TOPICAL
COMMUNITY
Start: 2020-02-29

## 2020-03-11 NOTE — PATIENT INSTRUCTIONS
Lung Anatomy  Your lungs take air in to give your body oxygen, which the body needs to work. Your lungs, like all the tissues in your body, are made up of billions of tiny specialized cells. Old lung cells die and are replaced by new, identical lung cells. This natural process helps ensure healthy lungs.    Date Last Reviewed: 11/1/2016  © 2936-8375 "[employer]. [address] [place] [address], [place] [address]. All rights reserved. This information is not intended as a substitute for professional medical care. Always follow your healthcare professional's instructions.

## 2020-03-11 NOTE — ASSESSMENT & PLAN NOTE
Repeat CT chest in 12 months.     Fleischner Society Guidelines:    High risk patient:   Smoking history, history of malignancy or risk factors for malignancy.( non-solid ground glass opacities and partially solid nodules may require longer follow up to exclude indolent adenocarcinoma)      Nodule size Low risk patient High risk patient   4 mm  No follow up Follow up CT in 12 months. If unchanged, no further follow up.   4 - 6 mm Follow up CT in 12 months; if unchanged, no further follow up.  Initial follow up CT in 6 - 12 months, then at 18 - 24 months if no change.      6 - 8 mm  Initial follow up CT at 6 - 12 months and at 18 months if no change.  Initial follow up CT at 3 - 6 months. Then at 9-12 months and 24 months if no change.      > 8 mm Initial follow up CT at 3, 6, 9 and 24 months, dynamic contrast enhanced CT, PET-CT and/or biopsy. Initial follow up CT at 3, 6, 9 and 24 months, dynamic contrast enhanced CT, PET-CT and/or biopsy.

## 2020-03-11 NOTE — PROGRESS NOTES
Subjective:      Patient ID: Dawn Gamboa is a 80 y.o. female.  Patient Active Problem List   Diagnosis    Knee pain, acute    Radiculopathy of lumbar region    DJD (degenerative joint disease) of lumbar spine    Impaired gait    Neuropathy    Cystitis    Multiple lung nodules     Problem list has been reviewed.    Chief Complaint: Pulmonary Nodules and Abnormal Ct Scan (03/02/2020)    HPI    CT chest reviewed with patient who voiced understanding.    Patients reports NO dyspnea    The patient does not have currently have symptoms / an exacerbation.       No recent change in breathing.      A full  review of systems, past , family  and social histories was performed except as mentioned in the note above, these are non contributory to the main issues discussed today.         Previous Report Reviewed: radiology reports     The following portions of the patient's history were reviewed and updated as appropriate: She  has a past medical history of Acid reflux, Arthritis, CKD (chronic kidney disease), Corneal dystrophy, Hypertension, Thyroid disease, and Unspecified disorder of kidney and ureter.  She  has a past surgical history that includes Gallbladder surgery; Anterior cervical discectomy w/ fusion; Laminectomy and microdiscectomy lumbar spine; Cholecystectomy; and Back surgery.  Her family history is not on file.  She  reports that she has never smoked. She has never used smokeless tobacco. She reports that she drank alcohol. She reports that she does not use drugs.  She has a current medication list which includes the following prescription(s): amlodipine, aspirin, calcium-vitamin d3, fexofenadine, fish oil-omega-3 fatty acids, folbic, gabapentin, gabapentin, lactobacillus acidophilus & bulgar, levothyroxine, lorazepam, losartan, mupirocin, polyethylene glycol, potassium chloride, pravastatin, sodium chloride 2%, walker, and fluticasone propionate.  She is allergic to atenolol; hyoscyamine sulfate; and  "levofloxacin..    Review of Systems   Constitutional: Negative for chills, fatigue and night sweats.   HENT: Positive for congestion. Negative for nosebleeds, rhinorrhea, sinus pressure and sore throat.    Respiratory: Negative for snoring, cough, wheezing and dyspnea on extertion.    Cardiovascular: Positive for leg swelling. Negative for chest pain and palpitations.   Genitourinary: Positive for difficulty urinating.   Endocrine: Negative for cold intolerance.    Musculoskeletal: Positive for arthralgias and back pain.   Skin: Positive for rash.   Gastrointestinal: Negative for nausea, abdominal pain, abdominal distention and acid reflux.   Neurological: Negative for syncope, light-headedness and headaches.   Psychiatric/Behavioral: The patient is nervous/anxious.         Objective:   BP (!) 140/68   Pulse 77   Temp 98.4 °F (36.9 °C) (Temporal)   Resp 18   Ht 5' 4" (1.626 m)   Wt 84 kg (185 lb 1.2 oz)   SpO2 98%   BMI 31.77 kg/m²   Body mass index is 31.77 kg/m².    Physical Exam   Constitutional: She is oriented to person, place, and time. She appears well-developed and well-nourished. No distress.   HENT:   Head: Normocephalic and atraumatic.   Neck: Normal range of motion. Neck supple.   Cardiovascular: Normal rate and regular rhythm.   Pulmonary/Chest: Effort normal. No stridor. No respiratory distress. She has no wheezes.   Abdominal: Soft. Normal appearance and bowel sounds are normal. She exhibits no distension. There is no hepatosplenomegaly. There is no tenderness. There is no rebound, no guarding, no CVA tenderness, no tenderness at McBurney's point and negative Vizcarra's sign.   Musculoskeletal: Normal range of motion.   Neurological: She is alert and oriented to person, place, and time.   Skin: Skin is warm and dry. Capillary refill takes less than 2 seconds. No rash noted. She is not diaphoretic.   Psychiatric: She has a normal mood and affect.   Nursing note and vitals reviewed.      Personal " Diagnostic Review  CT of chest performed on 03/11/20 without contrast :    1.  There are multiple calcified granulomas throughout the right lung with calcified hilar lymph nodes.  There are bilateral lower lobe noncalcified pleural-based nodules measuring up to 5 mm.  The inferior-most 5 mm nodule on the left is stable.  The other nodules were not evaluated on the prior study.  Please see Fleischner criteria guidelines described below.    2.  Negative for acute process otherwise.  Mild apical pleuroparenchymal and peripheral reticular interstitial changes noted.    3.  Stable findings as noted above within the abdomen and pelvis.  Nonemergent findings as noted above within the chest.    Assessment / plan:     Discussed diagnosis, its evaluation, treatment and usual course. All questions answered.    Problem List Items Addressed This Visit        Pulmonary    Multiple lung nodules - Primary (Chronic)    Current Assessment & Plan     Repeat CT chest in 12 months.     Fleischner Society Guidelines:    High risk patient:   Smoking history, history of malignancy or risk factors for malignancy.( non-solid ground glass opacities and partially solid nodules may require longer follow up to exclude indolent adenocarcinoma)      Nodule size Low risk patient High risk patient   4 mm  No follow up Follow up CT in 12 months. If unchanged, no further follow up.   4 - 6 mm Follow up CT in 12 months; if unchanged, no further follow up.  Initial follow up CT in 6 - 12 months, then at 18 - 24 months if no change.      6 - 8 mm  Initial follow up CT at 6 - 12 months and at 18 months if no change.  Initial follow up CT at 3 - 6 months. Then at 9-12 months and 24 months if no change.      > 8 mm Initial follow up CT at 3, 6, 9 and 24 months, dynamic contrast enhanced CT, PET-CT and/or biopsy. Initial follow up CT at 3, 6, 9 and 24 months, dynamic contrast enhanced CT, PET-CT and/or biopsy.              Relevant Orders    CT Chest Without  Contrast          TIME SPENT WITH PATIENT: Time spent: 30 minutes in face to face  discussion concerning diagnosis, prognosis, review of lab and test results, benefits of treatment as well as management of disease, counseling of patient and coordination of care between various health  care providers . Greater than half the time spent was used for coordination of care and counseling of patient.     Follow up in about 1 year (around 3/11/2021) for Multiple lung nodules.

## 2020-03-25 ENCOUNTER — DOCUMENTATION ONLY (OUTPATIENT)
Dept: REHABILITATION | Facility: HOSPITAL | Age: 80
End: 2020-03-25

## 2020-03-25 NOTE — PROGRESS NOTES
Outpatient Therapy Discharge Summary     Name: Dawn Gamboa  Clinic Number: [id number]    Therapy Diagnosis:    Chronic pain of left knee       Physician: Nora Hernandez MD  Physician Orders: PT Eval and Treat    Medical Diagnosis from Referral:   M25.562 (ICD-10-CM) - Left knee pain      Evaluation Date: 12/26/2019    Date of Last visit: 1/30/2020  Total Visits Received: 8  Cancelled Visits: 1  No Show Visits: 0    Assessment    Goals:  Short Term Goals:   1.I with HEP                             MET   2.Patient to demo increased AROM /PROM from current to full extension        ALMOST MET  3.Patient to report decreased pain subjectively             MET  4.Patient to ambulate about home with minimal pain    PARTIALLY MET     Long Term Goals:   1. Patient to perform daily activities including squatting to lift from floor and cooking/ food prep without limitation. IN PROGRESS  2. Patient to demonstrate increased knee AROM/PROM to WFL / equal to right .    IN PROGRESS  3. Patient to demonstrate increased LE strength to 5-/5.     IN PROGRESS  4. Patient to have decreased pain to <2/10 at all times .      IN PROGRESS  5. Patient to score less than  35% on the LEFS.                  NOT MET      Discharge reason: Patient has not attended therapy since 1/30/2020    Plan   This patient is discharged from Physical Therapy

## 2020-10-03 ENCOUNTER — IMMUNIZATION (OUTPATIENT)
Dept: INTERNAL MEDICINE | Facility: CLINIC | Age: 80
End: 2020-10-03
Payer: MEDICARE

## 2020-10-03 PROCEDURE — 90694 VACC AIIV4 NO PRSRV 0.5ML IM: CPT | Mod: PBBFAC

## 2020-10-03 PROCEDURE — G0008 ADMIN INFLUENZA VIRUS VAC: HCPCS | Mod: PBBFAC

## 2020-10-04 NOTE — PROGRESS NOTES
"Subjective:      Patient ID: Dawn Gamboa is a 79 y.o. female.    Chief Complaint: Urinary Tract Infection    Mrs. Gamboa presents to urgent care today with complaints of UTI symptoms not improving. She was seen in Urgent Care 4 days ago. Culture is positive for Klebsiella. The sensitivity report just recently resulted and Macrobid is resistant. Aside from the burning, urgency, and frequency, Mrs. Gamboa reports vaginal odor x few months. Also having some irritation over the last few days. She was treated with clindagel suppositories by her ObGyn in the past and got resolution of symptoms. Has been using antibacterial soaps and showering up to three times daily to try and alleviate the odor with no improvement.     Review of Systems   Constitutional: Negative.  Negative for chills and fever.   HENT: Negative.    Respiratory: Negative.    Cardiovascular: Negative.    Gastrointestinal: Negative.  Negative for abdominal pain, constipation, nausea and vomiting.   Genitourinary: Positive for dysuria and frequency.        See HPI   Musculoskeletal: Negative.    Skin: Negative.    Neurological: Negative.    Hematological: Negative.        Objective:   /70 (BP Location: Right arm, Patient Position: Sitting, BP Method: Medium (Manual))   Pulse 79   Temp 97.8 °F (36.6 °C) (Oral)   Ht 5' 4" (1.626 m)   Wt 85.7 kg (188 lb 15 oz)   SpO2 98%   BMI 32.43 kg/m²   Physical Exam   Constitutional: She is oriented to person, place, and time. She appears well-developed and well-nourished. No distress.   HENT:   Head: Normocephalic and atraumatic.   Neck: Normal range of motion. Neck supple.   Cardiovascular: Normal rate.   Pulmonary/Chest: Effort normal. No respiratory distress.   Abdominal: Soft. Normal appearance and bowel sounds are normal. She exhibits no distension. There is no hepatosplenomegaly. There is no tenderness. There is no rebound, no guarding, no CVA tenderness, no tenderness at McBurney's point and " negative Vizcarra's sign.   Musculoskeletal: Normal range of motion.   Neurological: She is alert and oriented to person, place, and time.   Skin: Skin is warm and dry. No rash noted. She is not diaphoretic.   Nursing note and vitals reviewed.    Assessment:      1. Urinary tract infection without hematuria, site unspecified    2. Acute vaginitis       Plan:   Urinary tract infection without hematuria, site unspecified  Comments:  Stop Macrobid. Will follow up when results of today's culture are available. Keflex BID. Increase fluids.   Orders:  -     POCT urinalysis, dipstick or tablet reag  -     Urine culture  -     cephALEXin (KEFLEX) 500 MG capsule; Take 1 capsule (500 mg total) by mouth every 12 (twelve) hours. for 7 days  Dispense: 14 capsule; Refill: 0    Acute vaginitis  Comments:  Suspected BV due to eradication of normal aileen bacteria with recent antibiotics, antibacterial soap, and frequent showering/bathing. Disc  Orders:  -     clindamycin (CLINDESSE) 2 % vaginal cream; Place 1 applicator vaginally every evening. Insert one applicatorful vaginally nightly for 7 days. for 7 days  Dispense: 40 g; Refill: 0    Instructions, follow up, and supportive care as per AVS.       Yes

## 2021-01-12 ENCOUNTER — IMMUNIZATION (OUTPATIENT)
Dept: INTERNAL MEDICINE | Facility: CLINIC | Age: 81
End: 2021-01-12
Payer: MEDICARE

## 2021-01-12 DIAGNOSIS — Z23 NEED FOR VACCINATION: ICD-10-CM

## 2021-01-12 PROCEDURE — 91300 COVID-19, MRNA, LNP-S, PF, 30 MCG/0.3 ML DOSE VACCINE: CPT | Mod: PBBFAC

## 2021-02-02 ENCOUNTER — IMMUNIZATION (OUTPATIENT)
Dept: INTERNAL MEDICINE | Facility: CLINIC | Age: 81
End: 2021-02-02
Payer: MEDICARE

## 2021-02-02 DIAGNOSIS — Z23 NEED FOR VACCINATION: Primary | ICD-10-CM

## 2021-02-02 PROCEDURE — 91300 COVID-19, MRNA, LNP-S, PF, 30 MCG/0.3 ML DOSE VACCINE: CPT | Mod: PBBFAC

## 2021-02-02 PROCEDURE — 0002A COVID-19, MRNA, LNP-S, PF, 30 MCG/0.3 ML DOSE VACCINE: CPT | Mod: PBBFAC

## 2021-02-22 ENCOUNTER — TELEPHONE (OUTPATIENT)
Dept: PULMONOLOGY | Facility: CLINIC | Age: 81
End: 2021-02-22

## 2021-02-22 DIAGNOSIS — C34.90 MALIGNANT NEOPLASM OF UNSPECIFIED PART OF UNSPECIFIED BRONCHUS OR LUNG: ICD-10-CM

## 2021-05-11 ENCOUNTER — TELEPHONE (OUTPATIENT)
Dept: PULMONOLOGY | Facility: CLINIC | Age: 81
End: 2021-05-11

## 2021-05-19 ENCOUNTER — HOSPITAL ENCOUNTER (OUTPATIENT)
Dept: RADIOLOGY | Facility: HOSPITAL | Age: 81
Discharge: HOME | End: 2021-05-19
Attending: INTERNAL MEDICINE
Payer: MEDICARE

## 2021-05-19 DIAGNOSIS — C34.90 MALIGNANT NEOPLASM OF UNSPECIFIED PART OF UNSPECIFIED BRONCHUS OR LUNG: ICD-10-CM

## 2021-05-19 PROCEDURE — 71250 CT THORAX DX C-: CPT | Mod: TC

## 2021-07-13 ENCOUNTER — OFFICE VISIT (OUTPATIENT)
Dept: URGENT CARE | Facility: CLINIC | Age: 81
End: 2021-07-13
Payer: MEDICARE

## 2021-07-13 VITALS
HEIGHT: 64 IN | TEMPERATURE: 96 F | DIASTOLIC BLOOD PRESSURE: 68 MMHG | OXYGEN SATURATION: 98 % | SYSTOLIC BLOOD PRESSURE: 140 MMHG | HEART RATE: 82 BPM | BODY MASS INDEX: 31.58 KG/M2 | RESPIRATION RATE: 18 BRPM | WEIGHT: 185 LBS

## 2021-07-13 DIAGNOSIS — J00 NASOPHARYNGITIS ACUTE: Primary | ICD-10-CM

## 2021-07-13 LAB
CTP QC/QA: YES
SARS-COV-2 RDRP RESP QL NAA+PROBE: NEGATIVE

## 2021-07-13 PROCEDURE — U0002 COVID-19 LAB TEST NON-CDC: HCPCS | Mod: QW,CR,S$GLB, | Performed by: PHYSICIAN ASSISTANT

## 2021-07-13 PROCEDURE — 99212 OFFICE O/P EST SF 10 MIN: CPT | Mod: S$GLB,,, | Performed by: PHYSICIAN ASSISTANT

## 2021-07-13 PROCEDURE — U0002: ICD-10-PCS | Mod: QW,CR,S$GLB, | Performed by: PHYSICIAN ASSISTANT

## 2021-07-13 PROCEDURE — 99212 PR OFFICE/OUTPT VISIT, EST, LEVL II, 10-19 MIN: ICD-10-PCS | Mod: S$GLB,,, | Performed by: PHYSICIAN ASSISTANT

## 2021-07-13 RX ORDER — TRIAMCINOLONE ACETONIDE 1 MG/G
OINTMENT TOPICAL
COMMUNITY
Start: 2021-03-29 | End: 2022-03-29

## 2021-07-13 RX ORDER — SERTRALINE HYDROCHLORIDE 50 MG/1
TABLET, FILM COATED ORAL
COMMUNITY
Start: 2021-06-17 | End: 2021-11-11

## 2021-11-11 ENCOUNTER — OFFICE VISIT (OUTPATIENT)
Dept: PULMONOLOGY | Facility: CLINIC | Age: 81
End: 2021-11-11
Payer: MEDICARE

## 2021-11-11 VITALS
HEART RATE: 88 BPM | WEIGHT: 172.19 LBS | DIASTOLIC BLOOD PRESSURE: 68 MMHG | HEIGHT: 64 IN | BODY MASS INDEX: 29.4 KG/M2 | SYSTOLIC BLOOD PRESSURE: 138 MMHG | RESPIRATION RATE: 14 BRPM | OXYGEN SATURATION: 98 %

## 2021-11-11 DIAGNOSIS — R91.8 MULTIPLE LUNG NODULES: Primary | ICD-10-CM

## 2021-11-11 PROCEDURE — 99215 OFFICE O/P EST HI 40 MIN: CPT | Mod: PBBFAC,PO | Performed by: NURSE PRACTITIONER

## 2021-11-11 PROCEDURE — 99999 PR PBB SHADOW E&M-EST. PATIENT-LVL V: CPT | Mod: PBBFAC,,, | Performed by: NURSE PRACTITIONER

## 2021-11-11 PROCEDURE — 99999 PR PBB SHADOW E&M-EST. PATIENT-LVL V: ICD-10-PCS | Mod: PBBFAC,,, | Performed by: NURSE PRACTITIONER

## 2021-11-11 PROCEDURE — 99214 PR OFFICE/OUTPT VISIT, EST, LEVL IV, 30-39 MIN: ICD-10-PCS | Mod: S$PBB,,, | Performed by: NURSE PRACTITIONER

## 2021-11-11 PROCEDURE — 99214 OFFICE O/P EST MOD 30 MIN: CPT | Mod: S$PBB,,, | Performed by: NURSE PRACTITIONER

## 2022-05-21 ENCOUNTER — OFFICE VISIT (OUTPATIENT)
Dept: URGENT CARE | Facility: CLINIC | Age: 82
End: 2022-05-21
Payer: MEDICARE

## 2022-05-21 VITALS
BODY MASS INDEX: 29.37 KG/M2 | OXYGEN SATURATION: 97 % | SYSTOLIC BLOOD PRESSURE: 126 MMHG | DIASTOLIC BLOOD PRESSURE: 60 MMHG | WEIGHT: 172 LBS | HEIGHT: 64 IN | RESPIRATION RATE: 18 BRPM | TEMPERATURE: 98 F | HEART RATE: 89 BPM

## 2022-05-21 DIAGNOSIS — N30.90 CYSTITIS: Primary | ICD-10-CM

## 2022-05-21 DIAGNOSIS — R30.0 DYSURIA: ICD-10-CM

## 2022-05-21 LAB
BILIRUB UR QL STRIP: NEGATIVE
COLOR UR: YELLOW
GLUCOSE UR QL STRIP: NEGATIVE
KETONES UR QL STRIP: NEGATIVE
LEUKOCYTE ESTERASE UR QL STRIP: POSITIVE
PH, POC UA: 6.5
POC BLOOD, URINE: POSITIVE
POC NITRATES, URINE: POSITIVE
PROT UR QL STRIP: POSITIVE
SP GR UR STRIP: 1.01 (ref 1–1.03)
UROBILINOGEN UR STRIP-ACNC: NORMAL (ref 0.1–1.1)

## 2022-05-21 PROCEDURE — 87086 URINE CULTURE/COLONY COUNT: CPT | Performed by: EMERGENCY MEDICINE

## 2022-05-21 PROCEDURE — 81003 POCT URINALYSIS, DIPSTICK, AUTOMATED, W/O SCOPE: ICD-10-PCS | Mod: QW,S$GLB,, | Performed by: EMERGENCY MEDICINE

## 2022-05-21 PROCEDURE — 87186 SC STD MICRODIL/AGAR DIL: CPT | Performed by: EMERGENCY MEDICINE

## 2022-05-21 PROCEDURE — 99214 PR OFFICE/OUTPT VISIT, EST, LEVL IV, 30-39 MIN: ICD-10-PCS | Mod: S$GLB,,, | Performed by: EMERGENCY MEDICINE

## 2022-05-21 PROCEDURE — 81003 URINALYSIS AUTO W/O SCOPE: CPT | Mod: QW,S$GLB,, | Performed by: EMERGENCY MEDICINE

## 2022-05-21 PROCEDURE — 87077 CULTURE AEROBIC IDENTIFY: CPT | Performed by: EMERGENCY MEDICINE

## 2022-05-21 PROCEDURE — 99214 OFFICE O/P EST MOD 30 MIN: CPT | Mod: S$GLB,,, | Performed by: EMERGENCY MEDICINE

## 2022-05-21 PROCEDURE — 87088 URINE BACTERIA CULTURE: CPT | Performed by: EMERGENCY MEDICINE

## 2022-05-21 RX ORDER — PHENAZOPYRIDINE HYDROCHLORIDE 100 MG/1
100 TABLET, FILM COATED ORAL 3 TIMES DAILY PRN
Qty: 6 TABLET | Refills: 0 | Status: SHIPPED | OUTPATIENT
Start: 2022-05-21 | End: 2022-05-21

## 2022-05-21 RX ORDER — SULFAMETHOXAZOLE AND TRIMETHOPRIM 800; 160 MG/1; MG/1
1 TABLET ORAL 2 TIMES DAILY
Qty: 10 TABLET | Refills: 0 | Status: SHIPPED | OUTPATIENT
Start: 2022-05-21 | End: 2022-05-26

## 2022-05-21 NOTE — PROGRESS NOTES
"Subjective:       Patient ID: Dawn Gamboa is a 82 y.o. female.    Vitals:  height is 5' 4" (1.626 m) and weight is 78 kg (172 lb). Her tympanic temperature is 98.2 °F (36.8 °C). Her blood pressure is 126/60 and her pulse is 89. Her respiration is 18 and oxygen saturation is 97%.     Chief Complaint: Dysuria     82-year-old female presents to urgent care for evaluation of possible bladder infection.  States that she started to feel poorly yesterday and last night she had a big episode of dysuria and foul-smelling cloudy  Urine.  Not running fever or having any vomiting.  No back pain with this.  She has had multiple UTIs in the past.  Has a history of some incontinence and wears pads.  Thinks that this has caused her to have more frequent UTIs    Dysuria   This is a new problem. The current episode started yesterday. The problem has been gradually worsening. The quality of the pain is described as burning. The pain is at a severity of 0/10 (pain was a 9 last night). The patient is experiencing no pain. There has been no fever. She is not sexually active. There is no history of pyelonephritis. Associated symptoms include a discharge, frequency, urgency and constipation. Pertinent negatives include no behavior changes, chills, flank pain, hematuria, hesitancy, nausea, possible pregnancy, sweats, vomiting, weight loss, bubble bath use, rash or withholding. The treatment provided no relief. Her past medical history is significant for hypertension and recurrent UTIs. There is no history of catheterization, diabetes insipidus, diabetes mellitus, genitourinary reflux, kidney stones, a single kidney, STD, urinary stasis or a urological procedure.       Constitution: Negative for chills, fatigue and fever.   Gastrointestinal: Positive for constipation. Negative for nausea and vomiting.   Genitourinary: Positive for dysuria, frequency and urgency. Negative for flank pain and hematuria.   Skin: Negative for rash.     "   Objective:      Physical Exam   Constitutional: She is oriented to person, place, and time. She does not appear ill. No distress.   HENT:   Head: Normocephalic and atraumatic.   Mouth/Throat: Mucous membranes are moist.   Pulmonary/Chest: Effort normal.   Abdominal: Normal appearance. She exhibits no distension. Soft. There is no abdominal tenderness. There is no left CVA tenderness and no right CVA tenderness.   Neurological: She is alert and oriented to person, place, and time.   Skin: Skin is warm and dry.   Psychiatric: Her behavior is normal.   Vitals reviewed.        Assessment:       1. Cystitis    2. Dysuria        Results for orders placed or performed in visit on 05/21/22   POCT Urinalysis, Dipstick, Automated, W/O Scope   Result Value Ref Range    POC Blood, Urine Positive (A) Negative    POC Bilirubin, Urine Negative Negative    POC Urobilinogen, Urine Normal 0.1 - 1.1    POC Ketones, Urine Negative Negative    POC Protein, Urine Positive (A) Negative    POC Nitrates, Urine Positive (A) Negative    POC Glucose, Urine Negative Negative    pH, UA 6.5     POC Specific Gravity, Urine 1.015 1.003 - 1.029    POC Leukocytes, Urine Positive (A) Negative    Color, UA Yellow Light Yellow, Yellow      chart review reveals previous urine culture showing organism with resistance to several antibiotics.  We will do Bactrim at this time.  Not doing pretty am given patient's history of renal insufficiency.  Most recent BUN and creatinine in April 2022 was normal.  Patient advised that we will call her with culture results in a few days    Plan:         Cystitis  -     sulfamethoxazole-trimethoprim 800-160mg (BACTRIM DS) 800-160 mg Tab; Take 1 tablet by mouth 2 (two) times daily. for 5 days  Dispense: 10 tablet; Refill: 0  -     Discontinue: phenazopyridine (PYRIDIUM) 100 MG tablet; Take 1 tablet (100 mg total) by mouth 3 (three) times daily as needed for Pain.  Dispense: 6 tablet; Refill: 0    Dysuria  -     POCT  Urinalysis, Dipstick, Automated, W/O Scope  -     Culture, Urine

## 2022-05-21 NOTE — PATIENT INSTRUCTIONS
Macrobid as prescribed for 5 days.  Take Pyridium for 2 days for relief of symptoms.  On day 3 if you still have symptoms present, please notify clinic.    A urine culture is being done.  If this indicates a need for change in antibiotic therapy, you will be contacted by phone and a prescription called in.  Drink plenty of water and rest.      Patient Education       Urinary Tract Infection, Adult ED   General Information   You came to the Emergency Department (ED) for a urinary tract infection or UTI. Most UTIs are infections in either your bladder or your kidneys. Bladder infections are more common and may also be called cystitis. Kidney infections are more serious and may also be called pyelonephritis. You need antibiotics to treat a UTI. It is important to take all of your antibiotics even if you start to feel better.  What care is needed at home?   Call your regular doctor to let them know you were in the ED. Make a follow-up appointment if you were told to.  For the first day or so, you may want to take an over-the-counter medicine, like phenazopyridine. This will help to numb your bladder. You will also not have the strong urge to urinate. This medicine causes your urine and tears to look orange. If you have kidney disease, talk to your doctor before taking this medicine.  To lower your chance of getting a UTI in the future, you can:  Drink extra fluids.  If you have sex, urinate right afterwards.  When do I need to get emergency help?   Return to the ED if:   You have very bad pain in your back, shoulder, or belly.  You have a fever of 102.2°F (39°C); shaking chills or sweats even though you are taking antibiotics.  When do I need to call the doctor?   You have a fever up to 100.4°F (38°C).  You notice more blood in your urine.  Your signs get worse or do not improve within 24 hours of starting treatment.  You are not able to urinate for more than 8 hours  Your signs come back after treatment has  stopped.  You have new or worsening symptoms.  Last Reviewed Date   2021-03-12  Consumer Information Use and Disclaimer   This information is not specific medical advice and does not replace information you receive from your health care provider. This is only a brief summary of general information. It does NOT include all information about conditions, illnesses, injuries, tests, procedures, treatments, therapies, discharge instructions or life-style choices that may apply to you. You must talk with your health care provider for complete information about your health and treatment options. This information should not be used to decide whether or not to accept your health care providers advice, instructions or recommendations. Only your health care provider has the knowledge and training to provide advice that is right for you.  Copyright   Copyright © 2021 [employer] and its affiliates and/or licensors. All rights reserved.

## 2022-05-23 LAB — BACTERIA UR CULT: ABNORMAL

## 2022-05-24 ENCOUNTER — TELEPHONE (OUTPATIENT)
Dept: URGENT CARE | Facility: CLINIC | Age: 82
End: 2022-05-24
Payer: COMMERCIAL

## 2022-05-24 NOTE — TELEPHONE ENCOUNTER
Discussed urine culture results with pt. Pt feeling much better on current antibiotics. Advised to finish full course and f/u if any further issues.

## 2022-05-25 ENCOUNTER — OFFICE VISIT (OUTPATIENT)
Dept: OPHTHALMOLOGY | Facility: CLINIC | Age: 82
End: 2022-05-25
Payer: MEDICARE

## 2022-05-25 DIAGNOSIS — H52.7 REFRACTIVE ERRORS: Primary | ICD-10-CM

## 2022-05-25 PROCEDURE — 92015 DETERMINE REFRACTIVE STATE: CPT | Mod: ,,, | Performed by: OPTOMETRIST

## 2022-05-25 PROCEDURE — 99999 PR PBB SHADOW E&M-EST. PATIENT-LVL III: CPT | Mod: PBBFAC,,, | Performed by: OPTOMETRIST

## 2022-05-25 PROCEDURE — 99999 PR PBB SHADOW E&M-EST. PATIENT-LVL III: ICD-10-PCS | Mod: PBBFAC,,, | Performed by: OPTOMETRIST

## 2022-05-25 PROCEDURE — 99499 NO LOS: ICD-10-PCS | Mod: S$PBB,,, | Performed by: OPTOMETRIST

## 2022-05-25 PROCEDURE — 99213 OFFICE O/P EST LOW 20 MIN: CPT | Mod: PBBFAC | Performed by: OPTOMETRIST

## 2022-05-25 PROCEDURE — 99499 UNLISTED E&M SERVICE: CPT | Mod: S$PBB,,, | Performed by: OPTOMETRIST

## 2022-05-25 PROCEDURE — 92015 PR REFRACTION: ICD-10-PCS | Mod: ,,, | Performed by: OPTOMETRIST

## 2023-03-02 ENCOUNTER — OFFICE VISIT (OUTPATIENT)
Dept: URGENT CARE | Facility: CLINIC | Age: 83
End: 2023-03-02
Payer: MEDICARE

## 2023-03-02 VITALS
HEART RATE: 70 BPM | SYSTOLIC BLOOD PRESSURE: 149 MMHG | BODY MASS INDEX: 29.35 KG/M2 | HEIGHT: 64 IN | WEIGHT: 171.94 LBS | RESPIRATION RATE: 16 BRPM | TEMPERATURE: 98 F | DIASTOLIC BLOOD PRESSURE: 66 MMHG | OXYGEN SATURATION: 99 %

## 2023-03-02 DIAGNOSIS — R30.0 DYSURIA: Primary | ICD-10-CM

## 2023-03-02 DIAGNOSIS — R35.0 URINE FREQUENCY: ICD-10-CM

## 2023-03-02 DIAGNOSIS — R31.29 MICROSCOPIC HEMATURIA: ICD-10-CM

## 2023-03-02 LAB
BILIRUB UR QL STRIP: NEGATIVE
GLUCOSE UR QL STRIP: NEGATIVE
KETONES UR QL STRIP: NEGATIVE
LEUKOCYTE ESTERASE UR QL STRIP: NEGATIVE
PH, POC UA: 7.5
POC BLOOD, URINE: POSITIVE
POC NITRATES, URINE: NEGATIVE
PROT UR QL STRIP: NEGATIVE
SP GR UR STRIP: 1 (ref 1–1.03)
UROBILINOGEN UR STRIP-ACNC: ABNORMAL (ref 0.1–1.1)

## 2023-03-02 PROCEDURE — 87086 URINE CULTURE/COLONY COUNT: CPT | Performed by: NURSE PRACTITIONER

## 2023-03-02 PROCEDURE — 81003 URINALYSIS AUTO W/O SCOPE: CPT | Mod: QW,S$GLB,, | Performed by: NURSE PRACTITIONER

## 2023-03-02 PROCEDURE — 99213 OFFICE O/P EST LOW 20 MIN: CPT | Mod: S$GLB,,, | Performed by: NURSE PRACTITIONER

## 2023-03-02 PROCEDURE — 99213 PR OFFICE/OUTPT VISIT, EST, LEVL III, 20-29 MIN: ICD-10-PCS | Mod: S$GLB,,, | Performed by: NURSE PRACTITIONER

## 2023-03-02 PROCEDURE — 81003 POCT URINALYSIS, DIPSTICK, AUTOMATED, W/O SCOPE: ICD-10-PCS | Mod: QW,S$GLB,, | Performed by: NURSE PRACTITIONER

## 2023-03-02 RX ORDER — FOLIC ACID-PYRIDOXINE-CYANOCOBALAMIN TAB 2.5-25-2 MG 2.5-25-2 MG
2 TAB ORAL DAILY
COMMUNITY
Start: 2023-01-26

## 2023-03-02 NOTE — PROGRESS NOTES
"Subjective:       Patient ID: Dawn Gamboa is a 83 y.o. female.    Vitals:  height is 5' 4" (1.626 m) and weight is 78 kg (171 lb 15.3 oz). Her temperature is 98.2 °F (36.8 °C). Her blood pressure is 149/66 (abnormal) and her pulse is 70. Her respiration is 16 and oxygen saturation is 99%.     Chief Complaint: Dysuria (Pt stated cloudy urine with incontinence x 1 week )    Patient presents for evaluation of urinary symptoms. Reports H/O frequent UTIs follow by Urology with appt scheduled later this month. Reports uncertainty regarding symptoms but states intermittent dysuria, frequency (recent increase in water consumption to manage history of fecal impactions), cloudy urine x 1. Reports noted symptoms for a few days. History of fecal incontinence, wears absorbent pad    Dysuria   This is a new problem. The current episode started in the past 7 days. The problem occurs every urination. The problem has been gradually worsening. The quality of the pain is described as burning. The pain is at a severity of 3/10. The patient is experiencing no pain. There has been no fever. Associated symptoms include flank pain, frequency and hesitancy. Pertinent negatives include no hematuria or urgency. She has tried nothing for the symptoms. The treatment provided no relief. Her past medical history is significant for recurrent UTIs.     Constitution: Negative.   HENT: Negative.     Neck: neck negative.   Cardiovascular: Negative.    Eyes: Negative.    Respiratory: Negative.     Gastrointestinal: Negative.    Genitourinary:  Positive for dysuria, frequency, flank pain and history of kidney stones. Negative for urgency, urine decreased and hematuria.   Musculoskeletal:  Negative for pain.   Skin: Negative.    Allergic/Immunologic: Negative.    Neurological: Negative.    Hematologic/Lymphatic: Negative.    Psychiatric/Behavioral: Negative.       Objective:      Physical Exam   Constitutional: She is oriented to person, place, and " time.  Non-toxic appearance. She does not appear ill. No distress.   HENT:   Head: Normocephalic and atraumatic.   Mouth/Throat: Mucous membranes are moist.   Eyes: Conjunctivae are normal. Pupils are equal, round, and reactive to light. Right eye exhibits no discharge. Left eye exhibits no discharge. No scleral icterus. Extraocular movement intact   Neck: Neck supple.   Cardiovascular: Normal rate. An irregular rhythm present.   Murmur heard.  Pulmonary/Chest: Effort normal and breath sounds normal. No stridor. No respiratory distress. She has no wheezes. She has no rhonchi. She has no rales. She exhibits no tenderness.   Abdominal: Normal appearance and bowel sounds are normal. She exhibits no distension. Soft. There is no abdominal tenderness. There is no rebound, no guarding, no left CVA tenderness and no right CVA tenderness. No hernia.   Musculoskeletal: Normal range of motion.         General: Normal range of motion.   Neurological: no focal deficit. She is alert and oriented to person, place, and time.   Skin: Skin is warm, dry and not diaphoretic.   Psychiatric: Her behavior is normal. Mood, judgment and thought content normal.   Nursing note and vitals reviewed.      Results for orders placed or performed in visit on 03/02/23   POCT Urinalysis, Dipstick, Automated, W/O Scope   Result Value Ref Range    POC Blood, Urine Positive (A) Negative    POC Bilirubin, Urine Negative Negative    POC Urobilinogen, Urine norm 0.1 - 1.1    POC Ketones, Urine Negative Negative    POC Protein, Urine Negative Negative    POC Nitrates, Urine Negative Negative    POC Glucose, Urine Negative Negative    pH, UA 7.5     POC Specific Gravity, Urine 1.005 1.003 - 1.029    POC Leukocytes, Urine Negative Negative       Assessment:       1. Dysuria    2. Urine frequency    3. Microscopic hematuria          Plan:       Patient presents with intermittent dysuria and frequency. Urine analysis (+) RBCs, patient reports chronic history  of microscopic hematuria, (-)Anh/nit. Decision to culture urine to confirm bacterial infection. Patient defers antibiotic course at this time and desires to wait for confirmation to initiate treatment if indicated. Will notify patient with urine culture results. Plan discussed with patient who verbalizes understanding.     Dysuria  -     POCT Urinalysis, Dipstick, Automated, W/O Scope  -     CULTURE, URINE    Urine frequency  -     POCT Urinalysis, Dipstick, Automated, W/O Scope  -     CULTURE, URINE    Microscopic hematuria  -     CULTURE, URINE                 Patient Instructions   Maintain hydration  Urinary hygiene discussed  UTI prevention discussed  Signs and symptoms discussed  Follow up with Urology as scheduled  Follow up as needed

## 2023-03-02 NOTE — PATIENT INSTRUCTIONS
Maintain hydration  Urinary hygiene discussed  UTI prevention discussed  Signs and symptoms discussed  Follow up with Urology as scheduled  Follow up as needed

## 2023-03-03 LAB
BACTERIA UR CULT: NORMAL
BACTERIA UR CULT: NORMAL

## 2023-03-05 ENCOUNTER — TELEPHONE (OUTPATIENT)
Dept: URGENT CARE | Facility: CLINIC | Age: 83
End: 2023-03-05
Payer: COMMERCIAL

## 2023-03-05 NOTE — TELEPHONE ENCOUNTER
Made courtesy call. Pt states that she is still having symptoms and will call her urologist in the morning to schedule and appt.

## 2023-07-07 ENCOUNTER — PATIENT MESSAGE (OUTPATIENT)
Dept: INFECTIOUS DISEASES | Facility: CLINIC | Age: 83
End: 2023-07-07
Payer: COMMERCIAL

## 2024-10-04 ENCOUNTER — OFFICE VISIT (OUTPATIENT)
Dept: OPHTHALMOLOGY | Facility: CLINIC | Age: 84
End: 2024-10-04
Payer: MEDICARE

## 2024-10-04 DIAGNOSIS — Z96.1 PSEUDOPHAKIA OF BOTH EYES: ICD-10-CM

## 2024-10-04 DIAGNOSIS — H52.7 REFRACTIVE ERRORS: ICD-10-CM

## 2024-10-04 DIAGNOSIS — H18.513 FUCHS' CORNEAL DYSTROPHY OF BOTH EYES: Primary | ICD-10-CM

## 2024-10-04 PROCEDURE — 99213 OFFICE O/P EST LOW 20 MIN: CPT | Mod: PBBFAC | Performed by: OPTOMETRIST

## 2024-10-04 PROCEDURE — 99999 PR PBB SHADOW E&M-EST. PATIENT-LVL III: CPT | Mod: PBBFAC,,, | Performed by: OPTOMETRIST

## 2024-10-04 RX ORDER — SOTALOL HYDROCHLORIDE 80 MG/1
TABLET ORAL
COMMUNITY
Start: 2024-08-03

## 2024-10-04 NOTE — PROGRESS NOTES
SUBJECTIVE  Dawn Gamboa is 84 y.o. female  Corrected distance visual acuity was 20/70 in the right eye and 20/200 in the left eye. Corrected near visual acuity was J3 in the right eye and J4 in the left eye.   Chief Complaint   Patient presents with    Eye Exam    Hypertensive Eye Exam          HPI    Decrease distance visual acuity with glasses.  Decrease vision left eye.  Glare bother patient.  Last eye exam 05/25/2022 [facility].  Update glasses RX.  Last eye exam 07/2024 Dr. Zhang  Patient wants to be refracted before dilation.    Using Scott 128 drops for Fuch's   Last edited by Brandon Castro, OD on 10/4/2024  3:14 PM.         Assessment /Plan :  1. Fuchs' corneal dystrophy of both eyes  Increase use of Scott 128, rtc if vision worsen    2. Pseudophakia of both eyes  Well-centered, stable IOL OU. Monitor annually.     3. Refractive errors  Dispense Final Rx for glasses.  RTC 1 year  Discussed above and answered questions.

## 2025-02-15 ENCOUNTER — OFFICE VISIT (OUTPATIENT)
Dept: URGENT CARE | Facility: CLINIC | Age: 85
End: 2025-02-15
Payer: MEDICARE

## 2025-02-15 VITALS
BODY MASS INDEX: 30.07 KG/M2 | TEMPERATURE: 97 F | HEART RATE: 65 BPM | HEIGHT: 64 IN | SYSTOLIC BLOOD PRESSURE: 176 MMHG | DIASTOLIC BLOOD PRESSURE: 74 MMHG | WEIGHT: 176.13 LBS | OXYGEN SATURATION: 98 %

## 2025-02-15 DIAGNOSIS — N39.0 URINARY TRACT INFECTION WITH HEMATURIA, SITE UNSPECIFIED: ICD-10-CM

## 2025-02-15 DIAGNOSIS — R30.0 DYSURIA: Primary | ICD-10-CM

## 2025-02-15 DIAGNOSIS — R10.9 RIGHT FLANK PAIN: ICD-10-CM

## 2025-02-15 DIAGNOSIS — R31.9 URINARY TRACT INFECTION WITH HEMATURIA, SITE UNSPECIFIED: ICD-10-CM

## 2025-02-15 DIAGNOSIS — I10 ELEVATED BLOOD PRESSURE READING IN OFFICE WITH DIAGNOSIS OF HYPERTENSION: ICD-10-CM

## 2025-02-15 LAB
BILIRUBIN, UA POC OHS: NEGATIVE
BLOOD, UA POC OHS: ABNORMAL
CLARITY, UA POC OHS: CLEAR
COLOR, UA POC OHS: YELLOW
GLUCOSE, UA POC OHS: NEGATIVE
KETONES, UA POC OHS: NEGATIVE
LEUKOCYTES, UA POC OHS: ABNORMAL
NITRITE, UA POC OHS: POSITIVE
PH, UA POC OHS: 7
PROTEIN, UA POC OHS: NEGATIVE
SPECIFIC GRAVITY, UA POC OHS: 1.01
UROBILINOGEN, UA POC OHS: 0.2

## 2025-02-15 PROCEDURE — 87086 URINE CULTURE/COLONY COUNT: CPT | Performed by: PHYSICIAN ASSISTANT

## 2025-02-15 RX ORDER — NITROFURANTOIN 25; 75 MG/1; MG/1
100 CAPSULE ORAL 2 TIMES DAILY
Qty: 10 CAPSULE | Refills: 0 | Status: SHIPPED | OUTPATIENT
Start: 2025-02-15 | End: 2025-02-20

## 2025-02-15 NOTE — PROGRESS NOTES
"Subjective:      Patient ID: Dawn Gamboa is a 85 y.o. female.    Vitals:  height is 5' 4" (1.626 m) and weight is 79.9 kg (176 lb 2.4 oz). Her tympanic temperature is 97.1 °F (36.2 °C). Her blood pressure is 176/74 (abnormal) and her pulse is 65. Her oxygen saturation is 98%.     Chief Complaint: Dysuria    Pt states that yesterday afternoon she began to feel flank pain on her right side.  She felt a burning sensation after she urinates.  Pt has hx of uti's, but states she hasn't had one in about a year. Usually related to fecal incontinence and pt states she has had few episodes of this recently. Established with Urologist. Believes she has had small renal stones before, never had any complications with these. Denies fever/chills, vaginal itching, hematuria.      Dysuria   This is a new problem. The current episode started yesterday. The problem occurs every urination. The problem has been unchanged. The quality of the pain is described as aching. The pain is at a severity of 2/10. The pain is mild. There has been no fever. There is A history of pyelonephritis. Associated symptoms include flank pain, frequency, hesitancy, urgency and constipation. Pertinent negatives include no behavior changes, chills, discharge, hematuria, nausea, possible pregnancy, sweats, vomiting, weight loss, bubble bath use, rash or withholding. She has tried nothing for the symptoms. Her past medical history is significant for recurrent UTIs.       Constitution: Negative for chills and fever.   Gastrointestinal:  Positive for constipation. Negative for nausea and vomiting.   Genitourinary:  Positive for dysuria, frequency, urgency and flank pain. Negative for hematuria, vaginal pain, vaginal discharge and vaginal odor.   Skin:  Negative for rash.      Objective:     Physical Exam   Constitutional: She appears well-developed.  Non-toxic appearance. She does not appear ill. No distress.   HENT:   Head: Normocephalic and atraumatic. "   Ears:   Right Ear: External ear normal.   Left Ear: External ear normal.   Nose: Nose normal.   Eyes: Conjunctivae and EOM are normal.   Neck: Neck supple.   Pulmonary/Chest: Effort normal.   Abdominal: Normal appearance. She exhibits no distension. Soft. There is no abdominal tenderness. There is no guarding, no left CVA tenderness and no right CVA tenderness.   Musculoskeletal: Normal range of motion.         General: Normal range of motion.   Neurological: no focal deficit. She is alert. She displays no weakness. Gait (uses walker at baseline) abnormal.   Skin: Skin is warm, dry, not diaphoretic, not pale and no rash.   Psychiatric: Her behavior is normal.     Results for orders placed or performed in visit on 02/15/25   POCT Urinalysis(Instrument)    Collection Time: 02/15/25 12:40 PM   Result Value Ref Range    Color, POC UA Yellow Yellow, Straw, Colorless    Clarity, POC UA Clear Clear    Glucose, POC UA Negative Negative    Bilirubin, POC UA Negative Negative    Ketones, POC UA Negative Negative    Spec Grav POC UA 1.015 1.005 - 1.030    Blood, POC UA Trace-lysed (A) Negative    pH, POC UA 7.0 5.0 - 8.0    Protein, POC UA Negative Negative    Urobilinogen, POC UA 0.2 <=1.0    Nitrite, POC UA Positive (A) Negative    WBC, POC UA Small (A) Negative         Assessment:     1. Dysuria    2. Elevated blood pressure reading in office with diagnosis of hypertension    3. Urinary tract infection with hematuria, site unspecified    4. Right flank pain        Plan:       Dysuria  -     POCT Urinalysis(Instrument)    Elevated blood pressure reading in office with diagnosis of hypertension    Urinary tract infection with hematuria, site unspecified  -     nitrofurantoin, macrocrystal-monohydrate, (MACROBID) 100 MG capsule; Take 1 capsule (100 mg total) by mouth 2 (two) times daily. for 5 days  Dispense: 10 capsule; Refill: 0  -     Urine Culture High Risk    Right flank pain  -     POCT Urinalysis(Instrument)  -      Urine Culture High Risk          Medical Decision Making:   History:   Old Medical Records: I decided to obtain old medical records.  Old Records Summarized: records from clinic visits.       <> Summary of Records: Previous urgent care visits, urinalysis, and urine cultures reviewed.   Clinical Tests:   Lab Tests: Ordered and Reviewed  Urgent Care Management:  UA consistent with UTI.  Patient states that typically her urologist start her on Macrobid.  Will initiate antibiotics and send urine culture. Pt will be notified if any changes need to be made to antibiotics. Pt advised to drink plenty of fluids and avoid caffeine or sugary beverages.  Blood pressure elevated at today's visit.  Patient states that she was a little stressed by seeing her weight today and also states that she has her  waiting for her in the car and he has Alzheimer's so this is making her a little worried.  States that she is able to monitor her blood pressure at home and typically takes her blood pressure meds in the evening.  Continue to monitor.  Recommend to rtc or follow up with primary care provider if symptoms do not improve or for any new or worsening symptoms.

## 2025-02-15 NOTE — PATIENT INSTRUCTIONS
Elevated Blood Pressure  Your blood pressure was elevated during your visit to the urgent care.  It was not so high that immediate care was needed but it is recommended that you monitor your blood pressure over the next week or two to make sure that it is not staying elevated.  Please have your blood pressure taken 2-3 times daily at different times of the day.  Write all of those blood pressures down and record the time that they were taken.  Keep all that information and take it with you to see your Primary Care Physician.  If your blood pressure is consistently above 140/90 you will need to follow up with your PCP more quickly.

## 2025-02-16 LAB — BACTERIA UR CULT: NORMAL

## 2025-06-19 ENCOUNTER — OFFICE VISIT (OUTPATIENT)
Dept: URGENT CARE | Facility: CLINIC | Age: 85
End: 2025-06-19
Payer: MEDICARE

## 2025-06-19 VITALS
HEIGHT: 64 IN | HEART RATE: 72 BPM | DIASTOLIC BLOOD PRESSURE: 68 MMHG | RESPIRATION RATE: 18 BRPM | OXYGEN SATURATION: 96 % | BODY MASS INDEX: 30.05 KG/M2 | WEIGHT: 176 LBS | TEMPERATURE: 99 F | SYSTOLIC BLOOD PRESSURE: 157 MMHG

## 2025-06-19 DIAGNOSIS — R35.0 URINE FREQUENCY: ICD-10-CM

## 2025-06-19 DIAGNOSIS — N30.91 CYSTITIS WITH HEMATURIA: Primary | ICD-10-CM

## 2025-06-19 LAB
BILIRUBIN, UA POC OHS: NEGATIVE
BLOOD, UA POC OHS: ABNORMAL
CLARITY, UA POC OHS: CLEAR
COLOR, UA POC OHS: COLORLESS
GLUCOSE, UA POC OHS: NEGATIVE
KETONES, UA POC OHS: NEGATIVE
LEUKOCYTES, UA POC OHS: ABNORMAL
NITRITE, UA POC OHS: POSITIVE
PH, UA POC OHS: 7
PROTEIN, UA POC OHS: NEGATIVE
SPECIFIC GRAVITY, UA POC OHS: 1.01
UROBILINOGEN, UA POC OHS: 0.2

## 2025-06-19 PROCEDURE — 81003 URINALYSIS AUTO W/O SCOPE: CPT | Mod: QW,S$GLB,, | Performed by: PHYSICIAN ASSISTANT

## 2025-06-19 PROCEDURE — 99214 OFFICE O/P EST MOD 30 MIN: CPT | Mod: S$GLB,,, | Performed by: PHYSICIAN ASSISTANT

## 2025-06-19 RX ORDER — NITROFURANTOIN 25; 75 MG/1; MG/1
100 CAPSULE ORAL 2 TIMES DAILY
Qty: 14 CAPSULE | Refills: 0 | Status: SHIPPED | OUTPATIENT
Start: 2025-06-19 | End: 2025-06-26

## 2025-06-19 NOTE — PROGRESS NOTES
"Subjective:      Patient ID: Dawn Gamboa is a 85 y.o. female.    Vitals:  height is 5' 4" (1.626 m) and weight is 79.8 kg (176 lb). Her tympanic temperature is 98.6 °F (37 °C). Her blood pressure is 157/68 (abnormal) and her pulse is 72. Her respiration is 18 and oxygen saturation is 96%.     Chief Complaint: Dysuria    Patient presents with UTI concerns. States she is having constant burning and the first 2 urination have an odor. Symptoms started 2 days ago.     Dysuria   This is a new problem. The current episode started in the past 7 days (2). The problem occurs every urination. The problem has been gradually worsening. The quality of the pain is described as burning. The pain is at a severity of 3/10. There has been no fever. She is Not sexually active. There is A history of pyelonephritis. Pertinent negatives include no behavior changes, chills, discharge, flank pain, frequency, hematuria, hesitancy, nausea, possible pregnancy, sweats, urgency, vomiting, weight loss, bubble bath use, constipation, rash or withholding. She has tried nothing for the symptoms. The treatment provided no relief.       Constitution: Negative for chills.   Gastrointestinal:  Negative for nausea, vomiting and constipation.   Genitourinary:  Positive for dysuria. Negative for frequency, urgency, flank pain and hematuria.   Skin:  Negative for rash.      Objective:     Physical Exam    Assessment:     1. Abnormal urine odor    2. Urine frequency        Plan:       Abnormal urine odor  -     POCT Urinalysis(Instrument)    Urine frequency                      "

## 2025-06-19 NOTE — PATIENT INSTRUCTIONS
Complete all antibiotics. You may take Azo no longer than 2 days to help with discomfort. Drink plenty of fluids. Avoid teas, caffeine and soda which can irritate urinary tract. Please follow up with your doctor for persistent symptoms after completion of antibiotics or return if not improving within 48-72 hours of antibiotics.  You need to be re-evaluated urgently if high fever, vomiting, severe flank pain, bloody urine, or decreased urine output.

## 2025-06-19 NOTE — PROGRESS NOTES
"Subjective:      Patient ID: Dawn Gamboa is a 85 y.o. female.    Vitals:  height is 5' 4" (1.626 m) and weight is 79.8 kg (176 lb). Her tympanic temperature is 98.6 °F (37 °C). Her blood pressure is 157/68 (abnormal) and her pulse is 72. Her respiration is 18 and oxygen saturation is 96%.     Chief Complaint: Dysuria    Dawn Gamboa is an 86 yo female presenting to clinic with UTI concerns. States she noticed an odor to her urine x2 days ago and is now experiencing constant urethral burning. Denies fever, chills, N/V/D, urinary frequency, suprapubic tenderness, flank pain, hematuria, vaginal discharge and irritation, and confusion.     Dysuria   This is a new problem. The current episode started in the past 7 days (2). The problem occurs every urination. The problem has been gradually worsening. The quality of the pain is described as burning. The pain is at a severity of 3/10. There has been no fever. She is Not sexually active. There is A history of pyelonephritis. Pertinent negatives include no behavior changes, chills, discharge, flank pain, frequency, hematuria, hesitancy, nausea, possible pregnancy, sweats, urgency, vomiting, weight loss, bubble bath use, constipation or withholding. She has tried nothing for the symptoms. The treatment provided no relief.       Constitution: Negative for chills and fever.   Gastrointestinal:  Negative for abdominal pain, nausea, vomiting and constipation.   Genitourinary:  Positive for dysuria and history of kidney stones. Negative for frequency, urgency, flank pain, hematuria, vaginal pain and vaginal discharge.      Objective:     Physical Exam   Constitutional: She is oriented to person, place, and time.   HENT:   Head: Normocephalic and atraumatic.   Nose: Nose normal.   Eyes: Conjunctivae are normal.   Cardiovascular: Normal rate and regular rhythm.   Murmur (Previous dx of aortic regurg) heard.  Systolic murmur is present with a grade of 3/6.  Pulmonary/Chest: " Effort normal and breath sounds normal.   Abdominal: Normal appearance. She exhibits no distension. Soft. There is no abdominal tenderness. There is no rebound, no guarding, no left CVA tenderness and no right CVA tenderness.   Neurological: no focal deficit. She is alert and oriented to person, place, and time.   Psychiatric: Her behavior is normal. Mood normal.     Results for orders placed or performed in visit on 06/19/25   POCT Urinalysis(Instrument)    Collection Time: 06/19/25  3:22 PM   Result Value Ref Range    Color, POC UA Colorless Yellow, Straw, Colorless    Clarity, POC UA Clear Clear    Glucose, POC UA Negative Negative    Bilirubin, POC UA Negative Negative    Ketones, POC UA Negative Negative    Spec Grav POC UA 1.010 1.005 - 1.030    Blood, POC UA Trace-intact (A) Negative    pH, POC UA 7.0 5.0 - 8.0    Protein, POC UA Negative Negative    Urobilinogen, POC UA 0.2 <=1.0    Nitrite, POC UA Positive (A) Negative    WBC, POC UA Small (A) Negative       Assessment:     1. Cystitis with hematuria    2. Urine frequency        Plan:       Cystitis with hematuria  -     POCT Urinalysis(Instrument)  -     nitrofurantoin, macrocrystal-monohydrate, (MACROBID) 100 MG capsule; Take 1 capsule (100 mg total) by mouth 2 (two) times daily. for 7 days  Dispense: 14 capsule; Refill: 0    Urine frequency    Caroline Fusilier PA-C [facility]       Patient Instructions   Complete all antibiotics. You may take Azo no longer than 2 days to help with discomfort. Drink plenty of fluids. Avoid teas, caffeine and soda which can irritate urinary tract. Please follow up with your doctor for persistent symptoms after completion of antibiotics or return if not improving within 48-72 hours of antibiotics.  You need to be re-evaluated urgently if high fever, vomiting, severe flank pain, bloody urine, or decreased urine output.                Medical Decision Making:   Initial Assessment:   Dawn Gamboa is an 85  yo female presenting to clinic with UTI concerns. States she noticed an odor to her urine x2 days ago and is now experiencing constant urethral burning. Denies fever, chills, N/V/D, urinary frequency, suprapubic tenderness, flank pain, hematuria, vaginal discharge and irritation, and confusion.   Differential Diagnosis:   Cystitis vs pyelonephritis   Clinical Tests:   Lab Tests: Ordered and Reviewed  Urgent Care Management:  Urinalysis consistent with UTI. +WBC, + Nitrites, +Blood   Uncomplicated UTI without chronic history. Do not need culture at this time. Will culture if symptoms are not alleviated within 48hr. Pt has collection materials at home.  Pt to start Macrobid per patient's request. Noted creatinine clearance calculated to be 66 ml/min  RTC or ER if new or worsening symptoms.

## 2025-07-17 ENCOUNTER — OFFICE VISIT (OUTPATIENT)
Dept: URGENT CARE | Facility: CLINIC | Age: 85
End: 2025-07-17
Payer: MEDICARE

## 2025-07-17 VITALS
HEIGHT: 62 IN | HEART RATE: 74 BPM | OXYGEN SATURATION: 97 % | BODY MASS INDEX: 30.49 KG/M2 | TEMPERATURE: 98 F | WEIGHT: 165.69 LBS | RESPIRATION RATE: 18 BRPM | DIASTOLIC BLOOD PRESSURE: 84 MMHG | SYSTOLIC BLOOD PRESSURE: 146 MMHG

## 2025-07-17 DIAGNOSIS — J02.9 SORE THROAT: ICD-10-CM

## 2025-07-17 DIAGNOSIS — R09.81 NASAL CONGESTION: Primary | ICD-10-CM

## 2025-07-17 LAB
CTP QC/QA: YES
MOLECULAR STREP A: NEGATIVE
POC MOLECULAR INFLUENZA A AGN: NEGATIVE
POC MOLECULAR INFLUENZA B AGN: NEGATIVE
SARS-COV+SARS-COV-2 AG RESP QL IA.RAPID: NEGATIVE

## 2025-07-17 NOTE — PATIENT INSTRUCTIONS
Thank you for allowing our team to take care of you today.  Your diagnosis is acute upper respiratory congestion with sore throat.  Testing done today was negative for Covid, Flu, Strep.  The testing is negative but still assume you can be contagious so be careful around those around you. Wear your mask around your sister right now.   Supportive care.  Symptom management as appropriate like the recommendations below.   If any symptoms continue or worsen, get an immediate medical provider/ER evaluation.  Followup here as needed.     SYMPTOM MEDICATIONS IF NEEDED AND APPROPRIATE:    You may gargle with warm salt water 4 times a day and as needed.     Drink plenty of fluids.    Make sure you are getting rest.    You can use cough drops or lozenges to soothe your sore throat.    Nasal saline spray to keep nasal passages moist. Coricidin products can be used for congestion if needed.     Humidifier can be used to keep moisture in the air.     Acetaminophen (Tylenol) can if no allergy or restriction for fever/aches/pains.

## 2025-07-17 NOTE — PROGRESS NOTES
"Subjective:      Patient ID: Dawn Gamboa is a 85 y.o. female.    Vitals:  height is 5' 2" (1.575 m) and weight is 75.1 kg (165 lb 10.8 oz). Her oral temperature is 98 °F (36.7 °C). Her blood pressure is 146/84 (abnormal) and her pulse is 74. Her respiration is 18 and oxygen saturation is 97%.     Chief Complaint: Sore Throat    84 yo female patient started having a sore throat night before last, throat pain improves during the day. Tylenol has helped with the pain. Patient has experienced mild post nasal drip and a headache. She chronically has dryness, sores in the right nostril which she uses Mupirocin. She is able to get around and do all she needs to do. Eating and drinking without any problem. She wants to check for strep to make sure she doesn't have it to give to her sister.     Sore Throat   This is a new problem. The current episode started in the past 7 days. Neither side of throat is experiencing more pain than the other. There has been no fever. Associated symptoms include congestion, coughing and headaches. Pertinent negatives include no diarrhea, ear pain, hoarse voice, plugged ear sensation, shortness of breath, trouble swallowing or vomiting. She has tried acetaminophen for the symptoms.       Constitution: Negative.   HENT:  Positive for congestion and sore throat. Negative for ear pain and trouble swallowing.    Cardiovascular: Negative.    Respiratory:  Positive for cough. Negative for shortness of breath.    Gastrointestinal:  Negative for nausea, vomiting and diarrhea.   Musculoskeletal:  Negative for muscle ache.   Skin:  Negative for rash.   Neurological:  Positive for headaches.      Objective:     Physical Exam   Constitutional: She is oriented to person, place, and time. No distress.   HENT:   Head: Normocephalic and atraumatic.   Ears:   Right Ear: Tympanic membrane, external ear and ear canal normal. Tympanic membrane is not perforated.   Left Ear: Tympanic membrane, external ear and " ear canal normal. Tympanic membrane is not perforated.   Nose: Congestion present. No rhinorrhea or sinus tenderness. No epistaxis.   Mouth/Throat: Mucous membranes are moist. No oropharyngeal exudate or posterior oropharyngeal erythema. Oropharynx is clear.   Eyes: Conjunctivae are normal. Pupils are equal, round, and reactive to light.   Neck: Neck supple.   Cardiovascular: Normal rate, regular rhythm, normal heart sounds and normal pulses.   Pulmonary/Chest: Effort normal and breath sounds normal.   Abdominal: Normal appearance.   Lymphadenopathy:     She has no cervical adenopathy.   Neurological: She is alert and oriented to person, place, and time.      Comments: Neuro with no acute focal deficit. Using rolling walker.    Skin: Skin is warm.         Comments: Age appropriate turgor.    Psychiatric: Her behavior is normal. Mood, judgment and thought content normal.   Nursing note and vitals reviewed.      Assessment:     1. Nasal congestion    2. Sore throat        Plan:       Nasal congestion    Sore throat  -     POCT Influenza A/B MOLECULAR  -     SARS Coronavirus 2 Antigen, POCT Manual Read  -     POCT Strep A, Molecular      Results for orders placed or performed in visit on 07/17/25   POCT Influenza A/B MOLECULAR    Collection Time: 07/17/25  1:46 PM   Result Value Ref Range    POC Molecular Influenza A Ag Negative Negative    POC Molecular Influenza B Ag Negative Negative     Acceptable Yes    SARS Coronavirus 2 Antigen, POCT Manual Read    Collection Time: 07/17/25  1:47 PM   Result Value Ref Range    SARS Coronavirus 2 Antigen Negative Negative, Presumptive Negative     Acceptable Yes    POCT Strep A, Molecular    Collection Time: 07/17/25  1:47 PM   Result Value Ref Range    Molecular Strep A, POC Negative Negative     Acceptable Yes          Review of patient's allergies indicates:   Allergen Reactions    Atenolol      Drops heart rate very low     Hyoscyamine sulfate     Levofloxacin Other (See Comments)     Joint pain  Joint Pain         SUMMARY:  See hpi. Testing currently negative for strep, covid, flu. Still advised contact precaution. Supportive measures. Immediate medical provider evaluation if worsens or new symptoms. See below.     Patient Instructions   Thank you for allowing our team to take care of you today.  Your diagnosis is acute upper respiratory congestion with sore throat.  Testing done today was negative for Covid, Flu, Strep.  The testing is negative but still assume you can be contagious so be careful around those around you. Wear your mask around your sister right now.   Supportive care.  Symptom management as appropriate like the recommendations below.   If any symptoms continue or worsen, get an immediate medical provider/ER evaluation.  Followup here as needed.     SYMPTOM MEDICATIONS IF NEEDED AND APPROPRIATE:    You may gargle with warm salt water 4 times a day and as needed.     Drink plenty of fluids.    Make sure you are getting rest.    You can use cough drops or lozenges to soothe your sore throat.    Nasal saline spray to keep nasal passages moist. Coricidin products can be used for congestion if needed.     Humidifier can be used to keep moisture in the air.     Acetaminophen (Tylenol) can if no allergy or restriction for fever/aches/pains.

## 2025-07-19 ENCOUNTER — TELEPHONE (OUTPATIENT)
Dept: URGENT CARE | Facility: CLINIC | Age: 85
End: 2025-07-19
Payer: COMMERCIAL

## 2025-07-19 NOTE — TELEPHONE ENCOUNTER
Contacted patient regarding her visit on 07/17/2025. I spoke to patient to see hoe she was feeling today, patient stated she is feeling better today, and has not been doing anything over the counter. I advised patient if she does not feel better over the next couple days to follow up with her primary care provider.

## 2025-09-02 ENCOUNTER — OFFICE VISIT (OUTPATIENT)
Dept: URGENT CARE | Facility: CLINIC | Age: 85
End: 2025-09-02
Payer: MEDICARE

## 2025-09-02 VITALS
RESPIRATION RATE: 20 BRPM | HEIGHT: 62 IN | SYSTOLIC BLOOD PRESSURE: 150 MMHG | OXYGEN SATURATION: 97 % | TEMPERATURE: 98 F | DIASTOLIC BLOOD PRESSURE: 67 MMHG | BODY MASS INDEX: 30.47 KG/M2 | WEIGHT: 165.56 LBS | HEART RATE: 72 BPM

## 2025-09-02 DIAGNOSIS — N30.01 ACUTE CYSTITIS WITH HEMATURIA: Primary | ICD-10-CM

## 2025-09-02 DIAGNOSIS — R30.0 DYSURIA: ICD-10-CM

## 2025-09-02 DIAGNOSIS — I10 ELEVATED BLOOD PRESSURE READING IN OFFICE WITH DIAGNOSIS OF HYPERTENSION: ICD-10-CM

## 2025-09-02 LAB
BILIRUBIN, UA POC OHS: NEGATIVE
BLOOD, UA POC OHS: ABNORMAL
CLARITY, UA POC OHS: ABNORMAL
COLOR, UA POC OHS: YELLOW
GLUCOSE, UA POC OHS: NEGATIVE
KETONES, UA POC OHS: NEGATIVE
LEUKOCYTES, UA POC OHS: ABNORMAL
NITRITE, UA POC OHS: POSITIVE
PH, UA POC OHS: 6.5
PROTEIN, UA POC OHS: 100
SPECIFIC GRAVITY, UA POC OHS: 1.02
UROBILINOGEN, UA POC OHS: 0.2

## 2025-09-02 PROCEDURE — 99214 OFFICE O/P EST MOD 30 MIN: CPT | Mod: S$GLB,,, | Performed by: PHYSICIAN ASSISTANT

## 2025-09-02 PROCEDURE — 81003 URINALYSIS AUTO W/O SCOPE: CPT | Mod: QW,S$GLB,, | Performed by: PHYSICIAN ASSISTANT

## 2025-09-02 RX ORDER — NITROFURANTOIN 25; 75 MG/1; MG/1
100 CAPSULE ORAL 2 TIMES DAILY
Qty: 14 CAPSULE | Refills: 0 | Status: SHIPPED | OUTPATIENT
Start: 2025-09-02 | End: 2025-09-09